# Patient Record
Sex: FEMALE | Race: WHITE | NOT HISPANIC OR LATINO | Employment: OTHER | URBAN - METROPOLITAN AREA
[De-identification: names, ages, dates, MRNs, and addresses within clinical notes are randomized per-mention and may not be internally consistent; named-entity substitution may affect disease eponyms.]

---

## 2017-01-25 ENCOUNTER — OFFICE VISIT (OUTPATIENT)
Dept: OPHTHALMOLOGY | Facility: CLINIC | Age: 82
End: 2017-01-25
Payer: MEDICARE

## 2017-01-25 ENCOUNTER — APPOINTMENT (OUTPATIENT)
Dept: OPHTHALMOLOGY | Facility: CLINIC | Age: 82
End: 2017-01-25
Payer: MEDICARE

## 2017-01-25 DIAGNOSIS — Z96.1 PSEUDOPHAKIA OF BOTH EYES: ICD-10-CM

## 2017-01-25 DIAGNOSIS — H26.491 POSTERIOR CAPSULAR OPACIFICATION, RIGHT: ICD-10-CM

## 2017-01-25 DIAGNOSIS — H04.123 DRY EYES, BILATERAL: ICD-10-CM

## 2017-01-25 DIAGNOSIS — H40.1131 PRIMARY OPEN ANGLE GLAUCOMA OF BOTH EYES, MILD STAGE: Primary | ICD-10-CM

## 2017-01-25 PROCEDURE — 92250 FUNDUS PHOTOGRAPHY W/I&R: CPT | Mod: PBBFAC,PO | Performed by: OPHTHALMOLOGY

## 2017-01-25 PROCEDURE — 92014 COMPRE OPH EXAM EST PT 1/>: CPT | Mod: S$PBB,,, | Performed by: OPHTHALMOLOGY

## 2017-01-25 PROCEDURE — 99212 OFFICE O/P EST SF 10 MIN: CPT | Mod: PBBFAC,PO | Performed by: OPHTHALMOLOGY

## 2017-01-25 PROCEDURE — 99999 PR PBB SHADOW E&M-EST. PATIENT-LVL II: CPT | Mod: PBBFAC,,, | Performed by: OPHTHALMOLOGY

## 2017-01-25 PROCEDURE — 92083 EXTENDED VISUAL FIELD XM: CPT | Mod: PBBFAC,PO | Performed by: OPHTHALMOLOGY

## 2017-01-25 NOTE — PROGRESS NOTES
SUBJECTIVE:   So Jaramillo is a 84 y.o. female   Uncorrected distance visual acuity was 20/40 -1 in the right eye and 20/25 in the left eye.   Chief Complaint   Patient presents with    Glaucoma     4 month HVF/FD, no drops    Dry Eye     Systane prn OU, Restasis prn OU        HPI:  HPI     Glaucoma    Additional comments: 4 month HVF/FD, no drops           Dry Eye    Additional comments: Systane prn OU, Restasis prn OU           Comments   Pt states she's been having a lot of dryness and pain in her eyes at night   OS>OD. States she's not using the Restasis as she should, also the   Systane.     1. PCIOL w/iStent OD 1/20/16   PCIOL OS w/iStent 12/30/15  2. Dry Eyes  3. Blepharitis/Rosacea  4. Mild COAG (Dx'd by Dr. Shaw) goal = 20  (init 21/21 on Latanoprost) (High IOP off of Latanoprost 30/30)         AT's PRN OU (usually 2 to 3 times a day)  Fish oil po  Restasis once in a while       Last edited by Lionel Donnelly on 1/25/2017  1:46 PM. (History)        Assessment /Plan :  1. Primary open angle glaucoma of both eyes, mild stage Doing well, IOP within acceptable range relative to target IOP and no evidence of progression. Continue current treatment. Reviewed importance of continued compliance with treatment and follow up.     2. Pseudophakia of both eyes doing well   3. Posterior capsular opacification, right discussed possible YAG in the future   4.      Dry Eyes continue Restasis OU BID and Artificial tears prn OU    Return to clinic in 4 months  or as needed.  With IOP Check and GOCT

## 2017-01-25 NOTE — MR AVS SNAPSHOT
[facility] - Ophthalmology  [address] [facility] [place] [address]  Phone: [phone]  Fax: [phone]                  So Jaramillo   2017 1:15 PM   Office Visit    Description:  Female : 10/6/1932   Provider:  Reynaldo Sauceda MD   Department:  [facility] - Ophthalmology           Reason for Visit     Glaucoma     Dry Eye           Diagnoses this Visit        Comments    Primary open angle glaucoma of both eyes, mild stage    -  Primary     Pseudophakia of both eyes         Posterior capsular opacification, right         Dry eyes, bilateral                To Do List           Goals (5 Years of Data)     None      Follow-Up and Disposition     Return in about 4 months (around 2017).      [facility]     [facility] On Call Nurse Care Line -  Assistance  Registered nurses in the [facility] On Call Center provide clinical advisement, health education, appointment booking, and other advisory services.  Call for this free service at [phone].             Medications           Message regarding Medications     Verify the changes and/or additions to your medication regime listed below are the same as discussed with your clinician today.  If any of these changes or additions are incorrect, please notify your healthcare provider.             Verify that the below list of medications is an accurate representation of the medications you are currently taking.  If none reported, the list may be blank. If incorrect, please contact your healthcare provider. Carry this list with you in case of emergency.           Current Medications     aspirin 81 MG Chew Take 81 mg by mouth once daily. Patient states that she takes this medication as needed.    CRANBERRY EXTRACT (CRANBERRY ORAL) Take by mouth as needed.    cycloSPORINE (RESTASIS) 0.05 % ophthalmic emulsion Place 0.4 mLs (1 drop total) into both eyes 2 (two) times daily.    dextran 70-hypromellose (TEARS) ophthalmic solution Place 1 drop into  both eyes as needed.    DOCOSAHEXANOIC ACID/EPA (FISH OIL ORAL) Take by mouth.    felodipine (PLENDIL) 5 MG 24 hr tablet Take 5 mg by mouth once daily.    hydrochlorothiazide (HYDRODIURIL) 12.5 MG Tab Take 12.5 mg by mouth once daily. Patient states that she does not take this medication every day.    lifitegrast 5 % Dpet Apply 1 drop to eye 2 (two) times daily.    lisinopril (PRINIVIL,ZESTRIL) 20 MG tablet Take 20 mg by mouth 2 (two) times daily.    lovastatin (MEVACOR) 40 MG tablet nightly.     multivitamin (ONE DAILY MULTIVITAMIN) per tablet Take 1 tablet by mouth as needed.    VITAMIN E ACETATE (VITAMIN E ORAL) Take by mouth as needed.           Clinical Reference Information           Allergies as of 1/25/2017     Sulfa (Sulfonamide Antibiotics)    Codeine      Immunizations Administered on Date of Encounter - 1/25/2017     None      Orders Placed During Today's Visit      Normal Orders This Visit    Color Fundus Photography - OU - Both Eyes     Walsh Visual Field - OU - Extended - Both Eyes       [facility] Sign-Up     Activating your [facility] account is as easy as 1-2-3!     1) Visit [email or web address], select Sign Up Now, enter this activation code and your date of birth, then select Next.  [email or web address]  Expires: 3/11/2017  2:03 PM      2) Create a username and password to use when you visit [facility] in the future and select a security question in case you lose your password and select Next.    3) Enter your e-mail address and click Sign Up!    Additional Information  If you have questions, please e-mail [email or web address] or call [phone] to talk to our [facility] staff. Remember, [facility] is NOT to be used for urgent needs. For medical emergencies, dial 911.

## 2017-06-26 ENCOUNTER — TELEPHONE (OUTPATIENT)
Dept: OPHTHALMOLOGY | Facility: CLINIC | Age: 82
End: 2017-06-26

## 2017-07-07 ENCOUNTER — OFFICE VISIT (OUTPATIENT)
Dept: OPHTHALMOLOGY | Facility: CLINIC | Age: 82
End: 2017-07-07
Payer: MEDICARE

## 2017-07-07 DIAGNOSIS — Z96.1 PSEUDOPHAKIA OF BOTH EYES: ICD-10-CM

## 2017-07-07 DIAGNOSIS — H04.123 DRY EYES, BILATERAL: ICD-10-CM

## 2017-07-07 DIAGNOSIS — H40.1131 PRIMARY OPEN ANGLE GLAUCOMA OF BOTH EYES, MILD STAGE: Primary | ICD-10-CM

## 2017-07-07 PROCEDURE — 99212 OFFICE O/P EST SF 10 MIN: CPT | Mod: PBBFAC,PO | Performed by: OPHTHALMOLOGY

## 2017-07-07 PROCEDURE — 92133 CPTRZD OPH DX IMG PST SGM ON: CPT | Mod: PBBFAC,PO | Performed by: OPHTHALMOLOGY

## 2017-07-07 PROCEDURE — 92012 INTRM OPH EXAM EST PATIENT: CPT | Mod: S$PBB,,, | Performed by: OPHTHALMOLOGY

## 2017-07-07 PROCEDURE — 99999 PR PBB SHADOW E&M-EST. PATIENT-LVL II: CPT | Mod: PBBFAC,,, | Performed by: OPHTHALMOLOGY

## 2017-07-07 NOTE — PROGRESS NOTES
SUBJECTIVE:   So Jaramillo is a 84 y.o. female   Uncorrected distance visual acuity was 20/40 -2 in the right eye and 20/25 -2 in the left eye.   Chief Complaint   Patient presents with    Glaucoma     here for 6 month iop check    Dry Eye     ou are very dry        HPI:  HPI     Glaucoma    Additional comments: here for 6 month iop check           Dry Eye    Additional comments: ou are very dry           Comments   1. PCIOL w/iStent OD 1/20/16   PCIOL OS w/iStent 12/30/15  2. Dry Eyes  3. Blepharitis/Rosacea  4. Mild COAG (Dx'd by Dr. Shaw) goal = 20  (init 21/21 on Latanoprost) (High IOP off of Latanoprost 30/30)         AT's PRN OU (usually 2 to 3 times a day)  Fish oil po  Restasis once in a while       Last edited by Maricarmen Rodriguez MA on 7/7/2017 11:12 AM. (History)        Assessment /Plan :  1. Primary open angle glaucoma of both eyes, mild stage Doing well, IOP within acceptable range relative to target IOP and no evidence of progression. Continue current treatment. Reviewed importance of continued compliance with treatment and follow up.     2. Dry eyes, bilateral recommend Xiidra OU BID   3. Pseudophakia of both eyes stable     Return to clinic in 6 months  or as needed.  With 24-2 HVF, Dilation and SDP's

## 2018-06-05 ENCOUNTER — OFFICE VISIT (OUTPATIENT)
Dept: OPHTHALMOLOGY | Facility: CLINIC | Age: 83
End: 2018-06-05
Payer: MEDICARE

## 2018-06-05 DIAGNOSIS — H04.123 DRY EYES, BILATERAL: ICD-10-CM

## 2018-06-05 DIAGNOSIS — H26.491 POSTERIOR CAPSULAR OPACIFICATION, RIGHT: ICD-10-CM

## 2018-06-05 DIAGNOSIS — H40.1131 PRIMARY OPEN ANGLE GLAUCOMA OF BOTH EYES, MILD STAGE: Primary | ICD-10-CM

## 2018-06-05 DIAGNOSIS — Z96.1 PSEUDOPHAKIA OF BOTH EYES: ICD-10-CM

## 2018-06-05 PROCEDURE — 99212 OFFICE O/P EST SF 10 MIN: CPT | Mod: PBBFAC | Performed by: OPHTHALMOLOGY

## 2018-06-05 PROCEDURE — 99999 PR PBB SHADOW E&M-EST. PATIENT-LVL II: CPT | Mod: PBBFAC,,, | Performed by: OPHTHALMOLOGY

## 2018-06-05 PROCEDURE — 92012 INTRM OPH EXAM EST PATIENT: CPT | Mod: S$PBB,,, | Performed by: OPHTHALMOLOGY

## 2018-06-05 RX ORDER — LATANOPROST 50 UG/ML
1 SOLUTION/ DROPS OPHTHALMIC NIGHTLY
Qty: 1 BOTTLE | Refills: 12 | Status: SHIPPED | OUTPATIENT
Start: 2018-06-05 | End: 2019-06-27 | Stop reason: SDUPTHER

## 2018-06-05 NOTE — PROGRESS NOTES
SUBJECTIVE:   So Jaramillo is a 85 y.o. female   Uncorrected distance visual acuity was 20/50 +1 in the right eye and 20/25 -2 in the left eye.   Chief Complaint   Patient presents with    Annual Exam    Glaucoma        HPI:  HPI     Pt here for annual dilated exam. No pain or discomfort. VA is a bit   blurry. Pt states that she was using an alternative to Restasis that Dr. Sauceda prescribed last time and didn't like it. She didn't like that she   could taste the drop. She says that she is no longer taking anything for   her eyes.      1. PCIOL w/iStent OD 1/20/16   PCIOL OS w/iStent 12/30/15  2. Dry Eyes (Restasis ineffective)  3. Blepharitis/Rosacea  4. Mild COAG (Dx'd by Dr. Shaw) goal = 20  (init 21/21 on Latanoprost) (High IOP off of Latanoprost 30/30)         AT's PRN OU (usually 2 to 3 times a day)    Last edited by Claudy Jones, Patient Care Assistant on 6/5/2018 10:29   AM. (History)        Assessment /Plan :  1. Primary open angle glaucoma of both eyes, mild stage   IOP not within acceptable range relative to target IOP with risk of irreversible visual loss. Better IOP control is recommended. Discussed options, risks, and benefits of additional medication, SLT laser, and/or incisional glaucoma surgery. Reviewed importance of continued compliance with treatment and follow up.     Patient chooses to resume latanoprost OU qhs     2. Dry eyes, bilateral    3. Pseudophakia of both eyes    4. Posterior capsular opacification, right      Return to clinic in 2 months  or as needed. Does not want to go to [facility] office  With IOP Check and Dilation

## 2018-07-30 ENCOUNTER — HOSPITAL ENCOUNTER (EMERGENCY)
Facility: HOSPITAL | Age: 83
Discharge: HOME | End: 2018-07-30
Attending: EMERGENCY MEDICINE
Payer: MEDICARE

## 2018-07-30 VITALS
OXYGEN SATURATION: 97 % | RESPIRATION RATE: 16 BRPM | BODY MASS INDEX: 27.09 KG/M2 | DIASTOLIC BLOOD PRESSURE: 69 MMHG | SYSTOLIC BLOOD PRESSURE: 157 MMHG | HEART RATE: 84 BPM | WEIGHT: 138 LBS | HEIGHT: 60 IN | TEMPERATURE: 97 F

## 2018-07-30 DIAGNOSIS — R53.1 GENERALIZED WEAKNESS: Primary | ICD-10-CM

## 2018-07-30 DIAGNOSIS — R07.9 CHEST PAIN: ICD-10-CM

## 2018-07-30 LAB
ALBUMIN SERPL BCP-MCNC: 3.3 G/DL
ALP SERPL-CCNC: 83 U/L
ALT SERPL W/O P-5'-P-CCNC: 8 U/L
ANION GAP SERPL CALC-SCNC: 10 MMOL/L
AST SERPL-CCNC: 13 U/L
BACTERIA #/AREA URNS HPF: NORMAL /HPF
BASOPHILS # BLD AUTO: 0.01 K/UL
BASOPHILS NFR BLD: 0.2 %
BILIRUB SERPL-MCNC: 0.4 MG/DL
BILIRUB UR QL STRIP: NEGATIVE
BNP SERPL-MCNC: 67 PG/ML
BUN SERPL-MCNC: 24 MG/DL
CALCIUM SERPL-MCNC: 8.9 MG/DL
CHLORIDE SERPL-SCNC: 109 MMOL/L
CLARITY UR: CLEAR
CO2 SERPL-SCNC: 21 MMOL/L
COLOR UR: YELLOW
CREAT SERPL-MCNC: 0.9 MG/DL
DIFFERENTIAL METHOD: ABNORMAL
EOSINOPHIL # BLD AUTO: 0.1 K/UL
EOSINOPHIL NFR BLD: 1 %
ERYTHROCYTE [DISTWIDTH] IN BLOOD BY AUTOMATED COUNT: 12.9 %
EST. GFR  (AFRICAN AMERICAN): >60 ML/MIN/1.73 M^2
EST. GFR  (NON AFRICAN AMERICAN): 58 ML/MIN/1.73 M^2
GLUCOSE SERPL-MCNC: 117 MG/DL
GLUCOSE UR QL STRIP: NEGATIVE
HCT VFR BLD AUTO: 38.2 %
HGB BLD-MCNC: 12.8 G/DL
HGB UR QL STRIP: ABNORMAL
KETONES UR QL STRIP: NEGATIVE
LACTATE SERPL-SCNC: 2.9 MMOL/L
LEUKOCYTE ESTERASE UR QL STRIP: ABNORMAL
LYMPHOCYTES # BLD AUTO: 3.1 K/UL
LYMPHOCYTES NFR BLD: 49.2 %
MCH RBC QN AUTO: 31.6 PG
MCHC RBC AUTO-ENTMCNC: 33.5 G/DL
MCV RBC AUTO: 94 FL
MICROSCOPIC COMMENT: NORMAL
MONOCYTES # BLD AUTO: 0.5 K/UL
MONOCYTES NFR BLD: 7.6 %
NEUTROPHILS # BLD AUTO: 2.6 K/UL
NEUTROPHILS NFR BLD: 42 %
NITRITE UR QL STRIP: NEGATIVE
PH UR STRIP: 7 [PH] (ref 5–8)
PLATELET # BLD AUTO: 189 K/UL
PMV BLD AUTO: 10 FL
POTASSIUM SERPL-SCNC: 4 MMOL/L
PROT SERPL-MCNC: 6.4 G/DL
PROT UR QL STRIP: NEGATIVE
RBC # BLD AUTO: 4.05 M/UL
RBC #/AREA URNS HPF: 2 /HPF (ref 0–4)
SODIUM SERPL-SCNC: 140 MMOL/L
SP GR UR STRIP: 1.01 (ref 1–1.03)
SQUAMOUS #/AREA URNS HPF: 0 /HPF
T4 FREE SERPL-MCNC: 0.97 NG/DL
TROPONIN I SERPL DL<=0.01 NG/ML-MCNC: <0.006 NG/ML
TSH SERPL DL<=0.005 MIU/L-ACNC: 4.06 UIU/ML
URN SPEC COLLECT METH UR: ABNORMAL
UROBILINOGEN UR STRIP-ACNC: NEGATIVE EU/DL
WBC # BLD AUTO: 6.22 K/UL
WBC #/AREA URNS HPF: 5 /HPF (ref 0–5)

## 2018-07-30 PROCEDURE — 80053 COMPREHEN METABOLIC PANEL: CPT

## 2018-07-30 PROCEDURE — 84443 ASSAY THYROID STIM HORMONE: CPT

## 2018-07-30 PROCEDURE — 83880 ASSAY OF NATRIURETIC PEPTIDE: CPT

## 2018-07-30 PROCEDURE — 85025 COMPLETE CBC W/AUTO DIFF WBC: CPT

## 2018-07-30 PROCEDURE — 84439 ASSAY OF FREE THYROXINE: CPT

## 2018-07-30 PROCEDURE — 25000003 PHARM REV CODE 250: Performed by: EMERGENCY MEDICINE

## 2018-07-30 PROCEDURE — 93010 ELECTROCARDIOGRAM REPORT: CPT | Mod: ,,, | Performed by: INTERNAL MEDICINE

## 2018-07-30 PROCEDURE — 99285 EMERGENCY DEPT VISIT HI MDM: CPT | Mod: 25

## 2018-07-30 PROCEDURE — 93005 ELECTROCARDIOGRAM TRACING: CPT

## 2018-07-30 PROCEDURE — 83605 ASSAY OF LACTIC ACID: CPT

## 2018-07-30 PROCEDURE — 84484 ASSAY OF TROPONIN QUANT: CPT

## 2018-07-30 PROCEDURE — 81000 URINALYSIS NONAUTO W/SCOPE: CPT

## 2018-07-30 PROCEDURE — 96360 HYDRATION IV INFUSION INIT: CPT

## 2018-07-30 RX ADMIN — SODIUM CHLORIDE 2000 ML: 0.9 INJECTION, SOLUTION INTRAVENOUS at 05:07

## 2018-07-30 NOTE — ED PROVIDER NOTES
SCRIBE #1 NOTE: I, Carmen Marte, am scribing for, and in the presence of, Conrad Pelayo MD. I have scribed the entire note.      History      Chief Complaint   Patient presents with    Abdominal Pain     epigastric pain that woke her up pta. EMS reports pt was diaphoretic and hypotensive on arrival. pt given 100 ml bolus in route, pt took asa pta.        Review of patient's allergies indicates:   Allergen Reactions    Sulfa (sulfonamide antibiotics) Other (See Comments)     Stomach pain    Codeine     Tramadol Other (See Comments)        HPI   HPI    7/30/2018, 3:34 AM   History obtained from the patient   History limited secondary to patient being poor historian      History of Present Illness: So Jaramillo is a 85 y.o. female patient who presents to the Emergency Department for CP. Patient reports sxs woke her from sleep, but is unsure what time that was. Symptoms are episodic and moderate in severity. Patient states she is unsure how long sxs lasted. Patient states she is CP free at this time. No mitigating or exacerbating factors reported. Patient reports being diaphoretic during episode and generalized weakness. Prior tx includes aspirin. Patient denies any SOB, diaphoresis, palpitations, extremity numbness, leg swelling, dizziness, cough, N/V, and all other sxs at this time. Patient reports PSHx includes 1 stent. No further complaints or concerns at this time.     Arrival mode: EMS     PCP: Kan Alston MD       Past Medical History:  Past Medical History:   Diagnosis Date    Arthritis     Cataract     Glaucoma     Hypertension        Past Surgical History:  Past Surgical History:   Procedure Laterality Date    carotid surgery      CATARACT EXTRACTION W/  INTRAOCULAR LENS IMPLANT Right 1-20-16    I stent    CATARACT EXTRACTION W/  INTRAOCULAR LENS IMPLANT Left 12-30-15    I stent    HEMORRHOID SURGERY      HYSTERECTOMY      TONSILLECTOMY           Family History:  Family History   Problem  Relation Age of Onset    Glaucoma Cousin     Cancer Father     Diabetes Maternal Aunt     Stroke Maternal Aunt     Diabetes Maternal Grandmother     Diabetes Other     Strabismus Neg Hx     Retinal detachment Neg Hx     Macular degeneration Neg Hx     Blindness Neg Hx     Amblyopia Neg Hx     Cataracts Neg Hx     Hypertension Neg Hx     Thyroid disease Neg Hx        Social History:  Social History     Social History Main Topics    Smoking status: Never Smoker    Smokeless tobacco: Never Used    Alcohol use No    Drug use: No    Sexual activity: Unknown       ROS   Review of Systems   Constitutional: Positive for diaphoresis. Negative for fever.   HENT: Negative for sore throat.    Respiratory: Negative for cough and shortness of breath.    Cardiovascular: Positive for chest pain. Negative for palpitations and leg swelling.   Gastrointestinal: Negative for nausea and vomiting.   Genitourinary: Negative for dysuria.   Musculoskeletal: Negative for back pain.   Skin: Negative for rash.   Neurological: Positive for weakness (generalized). Negative for dizziness and numbness.   Hematological: Does not bruise/bleed easily.   All other systems reviewed and are negative.      Physical Exam      Initial Vitals   BP Pulse Resp Temp SpO2   07/30/18 0323 07/30/18 0322 07/30/18 0323 07/30/18 0323 07/30/18 0323   (!) 131/98 69 19 97.5 °F (36.4 °C) 98 %      MAP       --                 Physical Exam  Nursing Notes and Vital Signs Reviewed.  Constitutional: Patient is in no acute distress. Well-developed and well-nourished.  Head: Atraumatic. Normocephalic.  Eyes: PERRL. EOM intact. Conjunctivae are not pale. No scleral icterus.  ENT: Mucous membranes are moist. Oropharynx is clear and symmetric.    Neck: Supple. Full ROM. No lymphadenopathy.  Cardiovascular: Regular rate. Regular rhythm. No murmurs, rubs, or gallops. Distal pulses are 2+ and symmetric.  Pulmonary/Chest: No respiratory distress. Clear to  auscultation bilaterally. No wheezing or rales.  Abdominal: Soft and non-distended.  There is no tenderness.  No rebound, guarding, or rigidity. Good bowel sounds.  Genitourinary: No CVA tenderness  Musculoskeletal: Moves all extremities. No obvious deformities. No edema. No calf tenderness.  Skin: Warm and dry.  Neurological:  Alert, awake, and appropriate.  Normal speech.  No acute focal neurological deficits are appreciated.  Psychiatric: Normal affect. Good eye contact. Appropriate in content.    ED Course    Procedures  ED Vital Signs:  Vitals:    07/30/18 0322 07/30/18 0323 07/30/18 0402 07/30/18 0522   BP:  (!) 131/98 (!) 146/63 (!) 143/65   Pulse: 69 63 60 64   Resp:  19 20 15   Temp:  97.5 °F (36.4 °C)  97 °F (36.1 °C)   TempSrc:  Oral  Rectal   SpO2:  98% 98% 98%   Weight:  62.6 kg (138 lb)     Height:  5' (1.524 m)      07/30/18 0704   BP: (!) 157/69   Pulse: 84   Resp: 16   Temp:    TempSrc:    SpO2: 97%   Weight:    Height:        Abnormal Lab Results:  Labs Reviewed   CBC W/ AUTO DIFFERENTIAL - Abnormal; Notable for the following components:       Result Value    MCH 31.6 (*)     Lymph% 49.2 (*)     All other components within normal limits   COMPREHENSIVE METABOLIC PANEL - Abnormal; Notable for the following components:    CO2 21 (*)     Glucose 117 (*)     BUN, Bld 24 (*)     Albumin 3.3 (*)     ALT 8 (*)     eGFR if non  58 (*)     All other components within normal limits   LACTIC ACID, PLASMA - Abnormal; Notable for the following components:    Lactate (Lactic Acid) 2.9 (*)     All other components within normal limits   URINALYSIS - Abnormal; Notable for the following components:    Occult Blood UA 1+ (*)     Leukocytes, UA 1+ (*)     All other components within normal limits   TSH - Abnormal; Notable for the following components:    TSH 4.061 (*)     All other components within normal limits   B-TYPE NATRIURETIC PEPTIDE   TROPONIN I   T4, FREE   URINALYSIS MICROSCOPIC        All  Lab Results:  Results for orders placed or performed during the hospital encounter of 07/30/18   CBC auto differential   Result Value Ref Range    WBC 6.22 3.90 - 12.70 K/uL    RBC 4.05 4.00 - 5.40 M/uL    Hemoglobin 12.8 12.0 - 16.0 g/dL    Hematocrit 38.2 37.0 - 48.5 %    MCV 94 82 - 98 fL    MCH 31.6 (H) 27.0 - 31.0 pg    MCHC 33.5 32.0 - 36.0 g/dL    RDW 12.9 11.5 - 14.5 %    Platelets 189 150 - 350 K/uL    MPV 10.0 9.2 - 12.9 fL    Gran # (ANC) 2.6 1.8 - 7.7 K/uL    Lymph # 3.1 1.0 - 4.8 K/uL    Mono # 0.5 0.3 - 1.0 K/uL    Eos # 0.1 0.0 - 0.5 K/uL    Baso # 0.01 0.00 - 0.20 K/uL    Gran% 42.0 38.0 - 73.0 %    Lymph% 49.2 (H) 18.0 - 48.0 %    Mono% 7.6 4.0 - 15.0 %    Eosinophil% 1.0 0.0 - 8.0 %    Basophil% 0.2 0.0 - 1.9 %    Differential Method Automated    Comprehensive metabolic panel   Result Value Ref Range    Sodium 140 136 - 145 mmol/L    Potassium 4.0 3.5 - 5.1 mmol/L    Chloride 109 95 - 110 mmol/L    CO2 21 (L) 23 - 29 mmol/L    Glucose 117 (H) 70 - 110 mg/dL    BUN, Bld 24 (H) 8 - 23 mg/dL    Creatinine 0.9 0.5 - 1.4 mg/dL    Calcium 8.9 8.7 - 10.5 mg/dL    Total Protein 6.4 6.0 - 8.4 g/dL    Albumin 3.3 (L) 3.5 - 5.2 g/dL    Total Bilirubin 0.4 0.1 - 1.0 mg/dL    Alkaline Phosphatase 83 55 - 135 U/L    AST 13 10 - 40 U/L    ALT 8 (L) 10 - 44 U/L    Anion Gap 10 8 - 16 mmol/L    eGFR if African American >60 >60 mL/min/1.73 m^2    eGFR if non African American 58 (A) >60 mL/min/1.73 m^2   Brain natriuretic peptide   Result Value Ref Range    BNP 67 0 - 99 pg/mL   Lactic acid, plasma   Result Value Ref Range    Lactate (Lactic Acid) 2.9 (H) 0.5 - 2.2 mmol/L   Troponin I   Result Value Ref Range    Troponin I <0.006 0.000 - 0.026 ng/mL   Urinalysis   Result Value Ref Range    Specimen UA Urine, Clean Catch     Color, UA Yellow Yellow, Straw, Maria D    Appearance, UA Clear Clear    pH, UA 7.0 5.0 - 8.0    Specific Gravity, UA 1.010 1.005 - 1.030    Protein, UA Negative Negative    Glucose, UA Negative  Negative    Ketones, UA Negative Negative    Bilirubin (UA) Negative Negative    Occult Blood UA 1+ (A) Negative    Nitrite, UA Negative Negative    Urobilinogen, UA Negative <2.0 EU/dL    Leukocytes, UA 1+ (A) Negative   TSH   Result Value Ref Range    TSH 4.061 (H) 0.400 - 4.000 uIU/mL   T4, free   Result Value Ref Range    Free T4 0.97 0.71 - 1.51 ng/dL   Urinalysis Microscopic   Result Value Ref Range    RBC, UA 2 0 - 4 /hpf    WBC, UA 5 0 - 5 /hpf    Bacteria, UA None None-Occ /hpf    Squam Epithel, UA 0 /hpf    Microscopic Comment SEE COMMENT        Imaging Results:  Imaging Results          X-Ray Chest PA And Lateral (Final result)  Result time 07/30/18 07:40:55    Final result by Jerrod Cervantes MD (07/30/18 07:40:55)                 Impression:      No acute process seen.      Electronically signed by: Jerrod Cervantes MD  Date:    07/30/2018  Time:    07:40             Narrative:    EXAMINATION:  XR CHEST PA AND LATERAL    CLINICAL HISTORY:  Chest pain, unspecified    COMPARISON:  None    FINDINGS:  Cardiac silhouette is normal.  The lungs demonstrate no evidence of active disease.  No evidence of pleural effusion or pneumothorax.  Bones demonstrate moderate degenerative changes..                               Ordered, reviewed, and independently interpreted by the ED provider.  Study: X-ray Chest  Findings: NAF    The EKG was ordered, reviewed, and independently interpreted by the ED provider.  Interpretation time: 0323  Rate: 61 BPM  Rhythm: atrial fibrillation  Interpretation: Nonspecific T wave abnormality. Abnormal ECG. No STEMI.         The Emergency Provider reviewed the vital signs and test results, which are outlined above.    ED Discussion     5:43 AM: Re-evaluated pt. Pt is resting comfortably and is in no acute distress.  D/w pt all pertinent results. D/w pt any concerns expressed at this time. Answered all questions. Pt expresses understanding at this time. Patient is requesting her daughter be  contacted, [phone].    6:02 AM: Called with patient's daughter, Suzi. Daughter states patient has been c/o CP for weeks. Patient's daughter states she is on her way to the ED to pick the patient up. Daughter states the patient will spend the next few days with her. Discussed with daughter to have patient f/u with PCP in a few days.    6:10 AM: Reassessed pt at this time. Discussed with pt all pertinent ED information and results. Discussed pt dx and plan of tx. Gave pt all f/u and return to the ED instructions. All questions and concerns were addressed at this time. Pt expresses understanding of information and instructions, and is comfortable with plan to discharge. Pt is stable for discharge.    I discussed with patient and/or family/caretaker that evaluation in the ED does not suggest any emergent or life threatening medical conditions requiring immediate intervention beyond what was provided in the ED, and I believe patient is safe for discharge.  Regardless, an unremarkable evaluation in the ED does not preclude the development or presence of a serious of life threatening condition. As such, patient was instructed to return immediately for any worsening or change in current symptoms.    ED Medication(s):  Medications   sodium chloride 0.9% bolus 2,000 mL (0 mLs Intravenous Stopped 7/30/18 0653)       Discharge Medication List as of 7/30/2018  6:11 AM          Follow-up Information     Kan Alston MD. Schedule an appointment as soon as possible for a visit in 3 days.    Specialty:  Internal Medicine  Contact information:  [address] [facility] [address]  [phone]             Go to  [facility]    Specialty:  Emergency Medicine  Why:  As needed, If symptoms worsen  Contact information:  [id number] [place] [address] [phone]                   Medical Decision Making    Medical Decision Making:   Clinical Tests:   Lab Tests: Ordered and  Reviewed  Radiological Study: Ordered and Reviewed  Medical Tests: Ordered and Reviewed           Scribe Attestation:   Scribe #1: I performed the above scribed service and the documentation accurately describes the services I performed. I attest to the accuracy of the note.    Attending:   Physician Attestation Statement for Scribe #1: I, Conrad Pelayo MD, personally performed the services described in this documentation, as scribed by Carmen Marte, in my presence, and it is both accurate and complete.          Clinical Impression       ICD-10-CM ICD-9-CM   1. Generalized weakness R53.1 780.79   2. Chest pain R07.9 786.50       Disposition:   Disposition: Discharged  Condition: Stable         Conrad Pelayo MD  09/01/18 6939

## 2018-07-30 NOTE — ED NOTES
Pt lying in bed, aaox4, in no acute distress, and with no complaint. Pt aware she is awaiting fluid admin and follow up by physician. Will continue to monitor. Bed in low position, side rails up, call light in reach.

## 2018-10-04 ENCOUNTER — OFFICE VISIT (OUTPATIENT)
Dept: OPHTHALMOLOGY | Facility: CLINIC | Age: 83
End: 2018-10-04
Payer: MEDICARE

## 2018-10-04 DIAGNOSIS — H52.4 BILATERAL PRESBYOPIA: ICD-10-CM

## 2018-10-04 DIAGNOSIS — Z96.1 PSEUDOPHAKIA OF BOTH EYES: ICD-10-CM

## 2018-10-04 DIAGNOSIS — H04.123 DRY EYES, BILATERAL: ICD-10-CM

## 2018-10-04 DIAGNOSIS — H40.1131 PRIMARY OPEN ANGLE GLAUCOMA OF BOTH EYES, MILD STAGE: Primary | ICD-10-CM

## 2018-10-04 PROCEDURE — 99999 PR PBB SHADOW E&M-EST. PATIENT-LVL II: CPT | Mod: PBBFAC,,, | Performed by: OPTOMETRIST

## 2018-10-04 PROCEDURE — 99212 OFFICE O/P EST SF 10 MIN: CPT | Mod: PBBFAC | Performed by: OPTOMETRIST

## 2018-10-04 PROCEDURE — 92015 DETERMINE REFRACTIVE STATE: CPT | Mod: ,,, | Performed by: OPTOMETRIST

## 2018-10-04 PROCEDURE — 92014 COMPRE OPH EXAM EST PT 1/>: CPT | Mod: S$PBB,,, | Performed by: OPTOMETRIST

## 2018-10-04 NOTE — PROGRESS NOTES
HPI     Glaucoma      Additional comments: IOP Check And Dilation              Comments     CPG Patient    Patient States Vision is Stable And No Discomfort      1. PCIOL w/iStent OD 1/20/16   PCIOL OS w/iStent 12/30/15  2. Dry Eyes (Restasis ineffective)  3. Blepharitis/Rosacea  4. Mild COAG (Dx'd by Dr. Shaw) goal = 20  (init 21/21 on Latanoprost) (High IOP off of Latanoprost 30/30)     OU: Latanoprost QHS   OU: AT's PRN          Last edited by Nisha Thompson on 10/4/2018 11:15 AM. (History)            Assessment /Plan     For exam results, see Encounter Report.    Primary open angle glaucoma of both eyes, mild stage    Dry eyes, bilateral    Pseudophakia of both eyes    Bilateral presbyopia      Stable IOP with latanoprost and iSent OU    Will resume O3FO    Artificial tears as needed.    Dispense Final Rx for glasses.  RTC 4 months IOP ck  Discussed above and answered questions.

## 2019-01-09 ENCOUNTER — OFFICE VISIT (OUTPATIENT)
Dept: OPHTHALMOLOGY | Facility: CLINIC | Age: 84
End: 2019-01-09
Payer: MEDICARE

## 2019-01-09 DIAGNOSIS — Z96.1 PSEUDOPHAKIA OF BOTH EYES: ICD-10-CM

## 2019-01-09 DIAGNOSIS — H40.1131 PRIMARY OPEN ANGLE GLAUCOMA OF BOTH EYES, MILD STAGE: ICD-10-CM

## 2019-01-09 DIAGNOSIS — H04.123 DRY EYES, BILATERAL: Primary | ICD-10-CM

## 2019-01-09 PROCEDURE — 92012 INTRM OPH EXAM EST PATIENT: CPT | Mod: S$PBB,,, | Performed by: OPHTHALMOLOGY

## 2019-01-09 PROCEDURE — 99999 PR PBB SHADOW E&M-EST. PATIENT-LVL II: ICD-10-PCS | Mod: PBBFAC,,, | Performed by: OPHTHALMOLOGY

## 2019-01-09 PROCEDURE — 99999 PR PBB SHADOW E&M-EST. PATIENT-LVL II: CPT | Mod: PBBFAC,,, | Performed by: OPHTHALMOLOGY

## 2019-01-09 PROCEDURE — 99212 OFFICE O/P EST SF 10 MIN: CPT | Mod: PBBFAC | Performed by: OPHTHALMOLOGY

## 2019-01-09 PROCEDURE — 92012 PR EYE EXAM, EST PATIENT,INTERMED: ICD-10-PCS | Mod: S$PBB,,, | Performed by: OPHTHALMOLOGY

## 2019-01-09 RX ORDER — CYCLOSPORINE 0.5 MG/ML
1 EMULSION OPHTHALMIC 2 TIMES DAILY
Qty: 60 VIAL | Refills: 12 | Status: SHIPPED | OUTPATIENT
Start: 2019-01-09 | End: 2021-04-12 | Stop reason: SDUPTHER

## 2019-01-09 NOTE — PROGRESS NOTES
SUBJECTIVE:   So Jaramillo is a 86 y.o. female   Uncorrected distance visual acuity was 20/50 in the right eye and 20/30 +1 in the left eye.   Chief Complaint   Patient presents with    Eye Problem        HPI:  HPI     Patient states that her right eye has been red and sore for 2 weeks. (8   on the pain scale)  Patient states that she is interested in restarting Restasis.  Patient is having a foreign body sensation right eye.    1. PCIOL w/iStent OD 1/20/16   PCIOL OS w/iStent 12/30/15  2. Dry Eyes (Restasis ineffective)  3. Blepharitis/Rosacea  4. Mild COAG (Dx'd by Dr. Shaw) goal = 20  (init 21/21 on Latanoprost) (High IOP off of Latanoprost 30/30)       OU: Latanoprost QHS   OU: Systane PRN    Last edited by Lisa Dior on 1/9/2019  1:29 PM. (History)        Assessment /Plan :  1. Dry eyes, bilateral resume restasis   2. Pseudophakia of both eyes    3. Primary open angle glaucoma of both eyes, mild stage        RTC as scheduled or prn

## 2019-02-12 ENCOUNTER — OFFICE VISIT (OUTPATIENT)
Dept: OPHTHALMOLOGY | Facility: CLINIC | Age: 84
End: 2019-02-12
Payer: MEDICARE

## 2019-02-12 DIAGNOSIS — Z96.1 PSEUDOPHAKIA OF BOTH EYES: ICD-10-CM

## 2019-02-12 DIAGNOSIS — H40.1131 PRIMARY OPEN ANGLE GLAUCOMA OF BOTH EYES, MILD STAGE: Primary | ICD-10-CM

## 2019-02-12 DIAGNOSIS — H04.123 DRY EYES, BILATERAL: ICD-10-CM

## 2019-02-12 DIAGNOSIS — G43.109 OCULAR MIGRAINE: ICD-10-CM

## 2019-02-12 PROCEDURE — 99212 OFFICE O/P EST SF 10 MIN: CPT | Mod: PBBFAC | Performed by: OPHTHALMOLOGY

## 2019-02-12 PROCEDURE — 92012 INTRM OPH EXAM EST PATIENT: CPT | Mod: S$PBB,,, | Performed by: OPHTHALMOLOGY

## 2019-02-12 PROCEDURE — 99999 PR PBB SHADOW E&M-EST. PATIENT-LVL II: CPT | Mod: PBBFAC,,, | Performed by: OPHTHALMOLOGY

## 2019-02-12 PROCEDURE — 99999 PR PBB SHADOW E&M-EST. PATIENT-LVL II: ICD-10-PCS | Mod: PBBFAC,,, | Performed by: OPHTHALMOLOGY

## 2019-02-12 PROCEDURE — 92012 PR EYE EXAM, EST PATIENT,INTERMED: ICD-10-PCS | Mod: S$PBB,,, | Performed by: OPHTHALMOLOGY

## 2019-02-12 NOTE — PROGRESS NOTES
SUBJECTIVE:   So Jaramillo is a 86 y.o. female   Uncorrected distance visual acuity was 20/40 +1 in the right eye and 20/25 -2 in the left eye.   Chief Complaint   Patient presents with    Glaucoma     Latanoprost OU qhs, Restasis OU BID, Systane prn OU        HPI:  HPI     Glaucoma      Additional comments: Latanoprost OU qhs, Restasis OU BID, Systane prn OU                Comments     4 months glaucoma check ( IOP check)     Patient states that she had  2 episodes of seeing crystal lights. Lasts   for a few minutes ? Both eyes  No headache    1. PCIOL w/iStent OD 1/20/16   PCIOL OS w/iStent 12/30/15  2. Dry Eyes (Restasis ineffective)  Xiidra - bad taste  3. Blepharitis/Rosacea  4. Mild COAG (Dx'd by Dr. Shaw) goal = 20  (init 21/21 on Latanoprost) (High IOP off of Latanoprost 30/30)       OU: Latanoprost QHS   OU: Systane PRN  Restasis OU BID              Last edited by Lisa Dior on 2/12/2019 10:10 AM. (History)        Assessment /Plan :  1. Primary open angle glaucoma of both eyes, mild stage Doing well, IOP within acceptable range relative to target IOP and no evidence of progression. Continue current treatment. Reviewed importance of continued compliance with treatment and follow up.     2. Pseudophakia of both eyes  -- Condition stable, no therapeutic change required. Monitoring routinely.     3. Dry eyes, bilateral  -- Condition stable, no therapeutic change required. Monitoring routinely.     4. Ocular migraine discussed reassurance and precautions       Return to clinic in 4 months  or as needed.  With IOP Check

## 2019-06-04 ENCOUNTER — OFFICE VISIT (OUTPATIENT)
Dept: OPHTHALMOLOGY | Facility: CLINIC | Age: 84
End: 2019-06-04
Payer: MEDICARE

## 2019-06-04 DIAGNOSIS — H04.123 DRY EYES, BILATERAL: ICD-10-CM

## 2019-06-04 DIAGNOSIS — H40.1131 PRIMARY OPEN ANGLE GLAUCOMA OF BOTH EYES, MILD STAGE: Primary | ICD-10-CM

## 2019-06-04 DIAGNOSIS — G43.109 OCULAR MIGRAINE: ICD-10-CM

## 2019-06-04 DIAGNOSIS — Z96.1 PSEUDOPHAKIA OF BOTH EYES: ICD-10-CM

## 2019-06-04 DIAGNOSIS — H26.491 POSTERIOR CAPSULAR OPACIFICATION, RIGHT: ICD-10-CM

## 2019-06-04 PROCEDURE — 92014 PR EYE EXAM, EST PATIENT,COMPREHESV: ICD-10-PCS | Mod: S$PBB,,, | Performed by: OPHTHALMOLOGY

## 2019-06-04 PROCEDURE — 99212 OFFICE O/P EST SF 10 MIN: CPT | Mod: PBBFAC | Performed by: OPHTHALMOLOGY

## 2019-06-04 PROCEDURE — 99999 PR PBB SHADOW E&M-EST. PATIENT-LVL II: CPT | Mod: PBBFAC,,, | Performed by: OPHTHALMOLOGY

## 2019-06-04 PROCEDURE — 99999 PR PBB SHADOW E&M-EST. PATIENT-LVL II: ICD-10-PCS | Mod: PBBFAC,,, | Performed by: OPHTHALMOLOGY

## 2019-06-04 PROCEDURE — 92014 COMPRE OPH EXAM EST PT 1/>: CPT | Mod: S$PBB,,, | Performed by: OPHTHALMOLOGY

## 2019-06-04 RX ORDER — AMOXICILLIN AND CLAVULANATE POTASSIUM 875; 125 MG/1; MG/1
1 TABLET, FILM COATED ORAL 2 TIMES DAILY
Refills: 0 | COMMUNITY
Start: 2019-03-25

## 2019-06-04 RX ORDER — AMLODIPINE BESYLATE 5 MG/1
5 TABLET ORAL DAILY
COMMUNITY
Start: 2019-06-03

## 2019-06-04 RX ORDER — HYDROCODONE BITARTRATE AND ACETAMINOPHEN 5; 325 MG/1; MG/1
0.5 TABLET ORAL DAILY PRN
COMMUNITY
Start: 2018-08-07

## 2019-06-04 RX ORDER — EZETIMIBE 10 MG/1
10 TABLET ORAL DAILY
Refills: 3 | COMMUNITY
Start: 2019-05-07

## 2019-06-04 RX ORDER — FLUTICASONE PROPIONATE 0.5 MG/G
CREAM TOPICAL
COMMUNITY

## 2019-06-04 NOTE — PROGRESS NOTES
SUBJECTIVE:   So Jaramillo is a 86 y.o. female   Uncorrected distance visual acuity was 20/300 in the right eye and 20/40 in the left eye.   Chief Complaint   Patient presents with    Glaucoma        HPI:  HPI     Pt here for 4m IOP chk. No pain or discomfort. VA stable. 100% compliant   with gtts.     CPG Patient      1. PCIOL w/iStent OD 1/20/16   PCIOL OS w/iStent 12/30/15  2. Dry Eyes (Restasis ineffective)  Xiidra - bad taste  3. Blepharitis/Rosacea  4. Mild COAG (Dx'd by Dr. Shaw) goal = 20  (init 21/21 on Latanoprost) (High IOP off of Latanoprost 30/30)   5. Ocular Migraine      OU: Latanoprost QHS   OU: Systane PRN  Restasis OU BID  Fish oil po prn    Last edited by Claudy Jones, Patient Care Assistant on 6/4/2019 10:11   AM. (History)        Assessment /Plan :  1. Primary open angle glaucoma of both eyes, mild stage Doing well, IOP within acceptable range relative to target IOP and no evidence of progression. Continue current treatment. Reviewed importance of continued compliance with treatment and follow up.     2. Pseudophakia of both eyes stable   3. Dry eyes, bilateral stable with current treatment   4. Ocular migraine    5. Posterior capsular opacification, right discussed RBA of YAG OD, pt agrees     RTC for YAG OD

## 2019-06-27 DIAGNOSIS — H40.1131 PRIMARY OPEN ANGLE GLAUCOMA OF BOTH EYES, MILD STAGE: ICD-10-CM

## 2019-06-28 RX ORDER — LATANOPROST 50 UG/ML
1 SOLUTION/ DROPS OPHTHALMIC NIGHTLY
Qty: 2.5 ML | Refills: 12 | Status: SHIPPED | OUTPATIENT
Start: 2019-06-28 | End: 2021-02-23

## 2019-07-08 ENCOUNTER — OFFICE VISIT (OUTPATIENT)
Dept: OPHTHALMOLOGY | Facility: CLINIC | Age: 84
End: 2019-07-08
Payer: MEDICARE

## 2019-07-08 DIAGNOSIS — H26.491 POSTERIOR CAPSULAR OPACIFICATION, RIGHT: Primary | ICD-10-CM

## 2019-07-08 PROCEDURE — 66821 YAG CAPSULOTOMY - OD - RIGHT EYE: ICD-10-PCS | Mod: S$PBB,RT,, | Performed by: OPHTHALMOLOGY

## 2019-07-08 PROCEDURE — 99499 UNLISTED E&M SERVICE: CPT | Mod: S$PBB,,, | Performed by: OPHTHALMOLOGY

## 2019-07-08 PROCEDURE — 99211 OFF/OP EST MAY X REQ PHY/QHP: CPT | Mod: PBBFAC | Performed by: OPHTHALMOLOGY

## 2019-07-08 PROCEDURE — 99499 NO LOS: ICD-10-PCS | Mod: S$PBB,,, | Performed by: OPHTHALMOLOGY

## 2019-07-08 PROCEDURE — 99999 PR PBB SHADOW E&M-EST. PATIENT-LVL I: ICD-10-PCS | Mod: PBBFAC,,, | Performed by: OPHTHALMOLOGY

## 2019-07-08 PROCEDURE — 99999 PR PBB SHADOW E&M-EST. PATIENT-LVL I: CPT | Mod: PBBFAC,,, | Performed by: OPHTHALMOLOGY

## 2019-07-08 PROCEDURE — 66821 AFTER CATARACT LASER SURGERY: CPT | Mod: PBBFAC,RT | Performed by: OPHTHALMOLOGY

## 2019-07-08 RX ORDER — FUROSEMIDE 20 MG/1
20 TABLET ORAL DAILY PRN
Refills: 0 | COMMUNITY
Start: 2019-06-12

## 2019-07-08 RX ORDER — PREDNISOLONE ACETATE 10 MG/ML
1 SUSPENSION/ DROPS OPHTHALMIC 4 TIMES DAILY
Qty: 1 BOTTLE | Refills: 2 | Status: SHIPPED | OUTPATIENT
Start: 2019-07-08 | End: 2019-07-15

## 2019-07-08 NOTE — PROGRESS NOTES
SUBJECTIVE:   So Jaramillo is a 86 y.o. female   Uncorrected distance visual acuity was 20/70 in the right eye and not recorded in the left eye.   Chief Complaint   Patient presents with    Blurred Vision     YAG OD        HPI:  HPI     Blurred Vision      Additional comments: YAG OD              Comments     Pt here for YAG OD.   No pain or discomfort.  VA stable.  100% compliant with gtts.     CPG Patient      1. PCIOL w/iStent OD 1/20/16   PCIOL OS w/iStent 12/30/15  2. Dry Eyes (Restasis ineffective)  Xiidra - bad taste  3. Blepharitis/Rosacea  4. Mild COAG (Dx'd by Dr. Shaw) goal = 20  (init 21/21 on Latanoprost) (High IOP off of Latanoprost 30/30)   5. Ocular Migraine      OU: Latanoprost QHS   OU: Systane PRN  Restasis OU BID  Fish oil po prn          Last edited by Claudy Jones, Patient Care Assistant on 7/8/2019  1:14   PM. (History)        Assessment /Plan :  1. Posterior capsular opacification, right  Yag Capsulotomy Procedure:    86 y.o. year old patient experiencing a symptomatic decrease in vision OD with inability to perform activities of daily living including reading.    SLE: Posterior intraocular lens implant with capsular fibrosis     Risks, benefits and alternatives of Yag Laser Capsulotomy discussed. Including risks of retinal detachment (1-3%), macular edema, dislocated implant, pain, inflammation elevated intraocular pressure and vision loss. Consent signed.    Medications given:  Apraclonidine gtt  Tetracaine gtt    Laser energy settings:  2.5  mJ / burst  58 bursts    IMPRESSION:  Yag Capsulotomy OD well tolerated    PLAN:  1. Prednisolone 1% QID over 1 week  2. Postoperative precautions discussed  3. RTC 2-3 weeks or prn

## 2019-08-13 ENCOUNTER — OFFICE VISIT (OUTPATIENT)
Dept: OPHTHALMOLOGY | Facility: CLINIC | Age: 84
End: 2019-08-13
Payer: MEDICARE

## 2019-08-13 DIAGNOSIS — Z98.890 POST-OPERATIVE STATE: ICD-10-CM

## 2019-08-13 DIAGNOSIS — H40.1131 PRIMARY OPEN ANGLE GLAUCOMA OF BOTH EYES, MILD STAGE: Primary | ICD-10-CM

## 2019-08-13 PROCEDURE — 99999 PR PBB SHADOW E&M-EST. PATIENT-LVL II: CPT | Mod: PBBFAC,,, | Performed by: OPHTHALMOLOGY

## 2019-08-13 PROCEDURE — 99024 PR POST-OP FOLLOW-UP VISIT: ICD-10-PCS | Mod: POP,,, | Performed by: OPHTHALMOLOGY

## 2019-08-13 PROCEDURE — 99999 PR PBB SHADOW E&M-EST. PATIENT-LVL II: ICD-10-PCS | Mod: PBBFAC,,, | Performed by: OPHTHALMOLOGY

## 2019-08-13 PROCEDURE — 99212 OFFICE O/P EST SF 10 MIN: CPT | Mod: PBBFAC | Performed by: OPHTHALMOLOGY

## 2019-08-13 PROCEDURE — 99024 POSTOP FOLLOW-UP VISIT: CPT | Mod: POP,,, | Performed by: OPHTHALMOLOGY

## 2019-08-13 NOTE — PROGRESS NOTES
SUBJECTIVE:   So Jaramillo is a 86 y.o. female   Uncorrected distance visual acuity was 20/80 -1 in the right eye and 20/30 -2 in the left eye.   Chief Complaint   Patient presents with    Post-op Evaluation    Glaucoma        HPI:  HPI     S.p yag od/ iop check      Doing ok states she need glasses failed her driving test     Last edited by Maricarmen Rodriguez MA on 8/13/2019 10:47 AM. (History)        Assessment /Plan :  1. Primary open angle glaucoma of both eyes, mild stage     Return to clinic in 3-4 months  or as needed.  With IOP Check     2. Post-operative state Exam:  SLE:  L/L- normal  S/C- white  K- stable  AC- no cells  LENS- PCIOL with clear capsulotomy    Assessment:    S/P Yag Capsulotomy OD . Discussed options for spectacle correction and pt elects to use Bifocal Rx. Recommend follow with Dr. Sauceda in 4 months, or sooner if needed

## 2019-09-10 ENCOUNTER — HOSPITAL ENCOUNTER (EMERGENCY)
Facility: HOSPITAL | Age: 84
Discharge: HOME | End: 2019-09-11
Attending: FAMILY MEDICINE
Payer: MEDICARE

## 2019-09-10 DIAGNOSIS — R53.1 WEAKNESS: ICD-10-CM

## 2019-09-10 DIAGNOSIS — R53.83 FATIGUE, UNSPECIFIED TYPE: Primary | ICD-10-CM

## 2019-09-10 LAB
ALBUMIN SERPL BCP-MCNC: 3.7 G/DL (ref 3.5–5.2)
ALP SERPL-CCNC: 92 U/L (ref 55–135)
ALT SERPL W/O P-5'-P-CCNC: 13 U/L (ref 10–44)
ANION GAP SERPL CALC-SCNC: 11 MMOL/L (ref 8–16)
AST SERPL-CCNC: 16 U/L (ref 10–40)
BASOPHILS # BLD AUTO: 0.01 K/UL (ref 0–0.2)
BASOPHILS NFR BLD: 0.1 % (ref 0–1.9)
BILIRUB SERPL-MCNC: 0.2 MG/DL (ref 0.1–1)
BILIRUB UR QL STRIP: NEGATIVE
BNP SERPL-MCNC: 76 PG/ML (ref 0–99)
BUN SERPL-MCNC: 28 MG/DL (ref 8–23)
CALCIUM SERPL-MCNC: 9.6 MG/DL (ref 8.7–10.5)
CHLORIDE SERPL-SCNC: 108 MMOL/L (ref 95–110)
CLARITY UR: CLEAR
CO2 SERPL-SCNC: 23 MMOL/L (ref 23–29)
COLOR UR: YELLOW
CREAT SERPL-MCNC: 0.9 MG/DL (ref 0.5–1.4)
DIFFERENTIAL METHOD: ABNORMAL
EOSINOPHIL # BLD AUTO: 0 K/UL (ref 0–0.5)
EOSINOPHIL NFR BLD: 0.1 % (ref 0–8)
ERYTHROCYTE [DISTWIDTH] IN BLOOD BY AUTOMATED COUNT: 13.2 % (ref 11.5–14.5)
EST. GFR  (AFRICAN AMERICAN): >60 ML/MIN/1.73 M^2
EST. GFR  (NON AFRICAN AMERICAN): 58 ML/MIN/1.73 M^2
GLUCOSE SERPL-MCNC: 156 MG/DL (ref 70–110)
GLUCOSE UR QL STRIP: ABNORMAL
HCT VFR BLD AUTO: 43.2 % (ref 37–48.5)
HGB BLD-MCNC: 14.3 G/DL (ref 12–16)
HGB UR QL STRIP: ABNORMAL
KETONES UR QL STRIP: ABNORMAL
LEUKOCYTE ESTERASE UR QL STRIP: NEGATIVE
LYMPHOCYTES # BLD AUTO: 1.4 K/UL (ref 1–4.8)
LYMPHOCYTES NFR BLD: 17.1 % (ref 18–48)
MCH RBC QN AUTO: 31.6 PG (ref 27–31)
MCHC RBC AUTO-ENTMCNC: 33.1 G/DL (ref 32–36)
MCV RBC AUTO: 95 FL (ref 82–98)
MONOCYTES # BLD AUTO: 0.2 K/UL (ref 0.3–1)
MONOCYTES NFR BLD: 2.7 % (ref 4–15)
NEUTROPHILS # BLD AUTO: 6.6 K/UL (ref 1.8–7.7)
NEUTROPHILS NFR BLD: 80.4 % (ref 38–73)
NITRITE UR QL STRIP: NEGATIVE
PH UR STRIP: 7 [PH] (ref 5–8)
PLATELET # BLD AUTO: 226 K/UL (ref 150–350)
PLATELET BLD QL SMEAR: ABNORMAL
PMV BLD AUTO: 10.5 FL (ref 9.2–12.9)
POTASSIUM SERPL-SCNC: 4.1 MMOL/L (ref 3.5–5.1)
PROT SERPL-MCNC: 7.4 G/DL (ref 6–8.4)
PROT UR QL STRIP: NEGATIVE
RBC # BLD AUTO: 4.53 M/UL (ref 4–5.4)
SODIUM SERPL-SCNC: 142 MMOL/L (ref 136–145)
SP GR UR STRIP: 1.01 (ref 1–1.03)
TROPONIN I SERPL DL<=0.01 NG/ML-MCNC: 0.01 NG/ML (ref 0–0.03)
URN SPEC COLLECT METH UR: ABNORMAL
UROBILINOGEN UR STRIP-ACNC: NEGATIVE EU/DL
WBC # BLD AUTO: 8.25 K/UL (ref 3.9–12.7)

## 2019-09-10 PROCEDURE — 80053 COMPREHEN METABOLIC PANEL: CPT

## 2019-09-10 PROCEDURE — 96365 THER/PROPH/DIAG IV INF INIT: CPT

## 2019-09-10 PROCEDURE — 96375 TX/PRO/DX INJ NEW DRUG ADDON: CPT

## 2019-09-10 PROCEDURE — 81003 URINALYSIS AUTO W/O SCOPE: CPT

## 2019-09-10 PROCEDURE — 85025 COMPLETE CBC W/AUTO DIFF WBC: CPT

## 2019-09-10 PROCEDURE — 93010 EKG 12-LEAD: ICD-10-PCS | Mod: ,,, | Performed by: INTERNAL MEDICINE

## 2019-09-10 PROCEDURE — 93005 ELECTROCARDIOGRAM TRACING: CPT

## 2019-09-10 PROCEDURE — 84484 ASSAY OF TROPONIN QUANT: CPT

## 2019-09-10 PROCEDURE — 96376 TX/PRO/DX INJ SAME DRUG ADON: CPT

## 2019-09-10 PROCEDURE — 25000003 PHARM REV CODE 250: Performed by: FAMILY MEDICINE

## 2019-09-10 PROCEDURE — 96361 HYDRATE IV INFUSION ADD-ON: CPT

## 2019-09-10 PROCEDURE — 99285 EMERGENCY DEPT VISIT HI MDM: CPT | Mod: 25

## 2019-09-10 PROCEDURE — 83880 ASSAY OF NATRIURETIC PEPTIDE: CPT

## 2019-09-10 PROCEDURE — 63600175 PHARM REV CODE 636 W HCPCS: Performed by: FAMILY MEDICINE

## 2019-09-10 PROCEDURE — 93010 ELECTROCARDIOGRAM REPORT: CPT | Mod: ,,, | Performed by: INTERNAL MEDICINE

## 2019-09-10 RX ORDER — CLONIDINE HYDROCHLORIDE 0.2 MG/1
0.2 TABLET ORAL
Status: COMPLETED | OUTPATIENT
Start: 2019-09-10 | End: 2019-09-10

## 2019-09-10 RX ORDER — ONDANSETRON 2 MG/ML
4 INJECTION INTRAMUSCULAR; INTRAVENOUS
Status: COMPLETED | OUTPATIENT
Start: 2019-09-10 | End: 2019-09-10

## 2019-09-10 RX ADMIN — ONDANSETRON 4 MG: 2 INJECTION INTRAMUSCULAR; INTRAVENOUS at 07:09

## 2019-09-10 RX ADMIN — ONDANSETRON 4 MG: 2 INJECTION INTRAMUSCULAR; INTRAVENOUS at 09:09

## 2019-09-10 RX ADMIN — SODIUM CHLORIDE 1000 ML: 0.9 INJECTION, SOLUTION INTRAVENOUS at 09:09

## 2019-09-10 RX ADMIN — PROMETHAZINE HYDROCHLORIDE 12.5 MG: 25 INJECTION INTRAMUSCULAR; INTRAVENOUS at 10:09

## 2019-09-10 RX ADMIN — SODIUM CHLORIDE 1000 ML: 0.9 INJECTION, SOLUTION INTRAVENOUS at 11:09

## 2019-09-10 RX ADMIN — CLONIDINE HYDROCHLORIDE 0.2 MG: 0.2 TABLET ORAL at 09:09

## 2019-09-10 NOTE — ED NOTES
Bed: [facility] 3  Expected date:   Expected time:   Means of arrival:   Comments:

## 2019-09-11 VITALS
BODY MASS INDEX: 27.73 KG/M2 | DIASTOLIC BLOOD PRESSURE: 61 MMHG | WEIGHT: 142 LBS | RESPIRATION RATE: 17 BRPM | SYSTOLIC BLOOD PRESSURE: 132 MMHG | OXYGEN SATURATION: 100 % | HEART RATE: 70 BPM | TEMPERATURE: 98 F

## 2019-09-11 RX ORDER — HYDRALAZINE HYDROCHLORIDE 20 MG/ML
10 INJECTION INTRAMUSCULAR; INTRAVENOUS
Status: DISCONTINUED | OUTPATIENT
Start: 2019-09-11 | End: 2019-09-11 | Stop reason: HOSPADM

## 2019-09-11 NOTE — ED NOTES
"Patient requesting to use the restroom but addiment about not using the bedpan, patient stated "that thing was too much work last time, I want to get up and go to the bathroom." Explained to patient that due to her complaint, the bedpan is the better option. Patient continued to refuse bedpan. Offered patient a bedside commode with assistance by myself, patient ok with trying bedside commode. Assisted patient in and out of bed, patient tolerated well. Patients linen changed, patient placed back in bed, patient repositioned, call light in reach.   "

## 2019-09-11 NOTE — ED PROVIDER NOTES
"SCRIBE #1 NOTE: I, Mimi Tan, am scribing for, and in the presence of, Jud Baker MD. I have scribed the entire note.       History     Chief Complaint   Patient presents with    Fatigue     c/o weakness x 4 hours. states "I was unable to give off the couch so I called EMS"      Review of patient's allergies indicates:   Allergen Reactions    Sulfa (sulfonamide antibiotics) Other (See Comments)     Stomach pain    Codeine     Tramadol Other (See Comments)         History of Present Illness     HPI    9/10/2019, 9:20 PM  History obtained from the patient      History of Present Illness: So Jaramillo is a 86 y.o. female patient with a PMHx of HLD and HTN who presents to the Emergency Department for evaluation of n/v/d which onset gradually 4 hours ago. Patient reports eating a sandwich from [employer] that "tasted bad". Symptoms are constant and moderate in severity. No mitigating or exacerbating factors reported. Associated sxs include fatigue and generalized weakness. Patient denies any abdominal pain, fever, chills, dysuria, CP, SOB, palpitations, and all other sxs at this time. No prior Tx. No further complaints or concerns at this time.         Arrival mode: EMS    PCP: Kan Alston MD        Past Medical History:  Past Medical History:   Diagnosis Date    Arthritis     Cataract     Glaucoma     Hyperlipidemia     Hypertension        Past Surgical History:  Past Surgical History:   Procedure Laterality Date    carotid surgery      CATARACT EXTRACTION W/  INTRAOCULAR LENS IMPLANT Right 1-20-16    I stent    CATARACT EXTRACTION W/  INTRAOCULAR LENS IMPLANT Left 12-30-15    I stent    HEMORRHOID SURGERY      HYSTERECTOMY      TONSILLECTOMY           Family History:  Family History   Problem Relation Age of Onset    Glaucoma Cousin     Cancer Father     Diabetes Maternal Aunt     Stroke Maternal Aunt     Diabetes Maternal Grandmother     Diabetes Other     Strabismus Neg Hx     " Retinal detachment Neg Hx     Macular degeneration Neg Hx     Blindness Neg Hx     Amblyopia Neg Hx     Cataracts Neg Hx     Hypertension Neg Hx     Thyroid disease Neg Hx        Social History:  Social History     Tobacco Use    Smoking status: Never Smoker    Smokeless tobacco: Never Used   Substance and Sexual Activity    Alcohol use: No    Drug use: No    Sexual activity: unknown        Review of Systems     Review of Systems   Constitutional: Positive for fatigue. Negative for activity change, appetite change, chills, diaphoresis and fever.        (+) generalized weakness   HENT: Negative for congestion, ear pain, nosebleeds, rhinorrhea, sinus pain, sore throat and trouble swallowing.    Eyes: Negative for pain and discharge.   Respiratory: Negative for cough, chest tightness, shortness of breath, wheezing and stridor.    Cardiovascular: Negative for chest pain, palpitations and leg swelling.   Gastrointestinal: Positive for diarrhea, nausea and vomiting. Negative for abdominal distention, abdominal pain, blood in stool and constipation.   Genitourinary: Negative for difficulty urinating, dysuria, flank pain, frequency, hematuria and urgency.   Musculoskeletal: Negative for arthralgias, back pain, myalgias and neck pain.   Skin: Negative for pallor, rash and wound.   Neurological: Negative for dizziness, syncope, weakness, light-headedness, numbness and headaches.   Hematological: Does not bruise/bleed easily.   Psychiatric/Behavioral: Negative for confusion and self-injury.   All other systems reviewed and are negative.     Physical Exam     Initial Vitals [09/10/19 1840]   BP Pulse Resp Temp SpO2   (!) 164/72 88 18 97.6 °F (36.4 °C) 99 %      MAP       --          Physical Exam  Nursing Notes and Vital Signs Reviewed.  Constitutional: Patient is in no acute distress. Well-developed and well-nourished.  Head: Atraumatic. Normocephalic.  Eyes: PERRL. EOM intact. Conjunctivae are not pale. No scleral  icterus.  ENT: Mucous membranes are moist. Oropharynx is clear and symmetric.    Neck: Supple. Full ROM. No lymphadenopathy.  Cardiovascular: Regular rate. Regular rhythm. No murmurs, rubs, or gallops. Distal pulses are 2+ and symmetric.  Pulmonary/Chest: No respiratory distress. Clear to auscultation bilaterally. No wheezing or rales.  Abdominal: Soft and non-distended.  There is no tenderness.  No rebound, guarding, or rigidity. Good bowel sounds.  Genitourinary: No CVA tenderness  Musculoskeletal: Moves all extremities. No obvious deformities. No edema. No calf tenderness.  Skin: Warm and dry.  Neurological:  Alert, awake, and appropriate.  Normal speech.  No acute focal neurological deficits are appreciated.  Psychiatric: Normal affect. Good eye contact. Appropriate in content.     ED Course   Procedures  ED Vital Signs:  Vitals:    09/10/19 1840 09/10/19 2047 09/10/19 2332 09/10/19 2347   BP: (!) 164/72 (!) 227/100 (!) 170/67 (!) 175/81   Pulse: 88 77 74 76   Resp: 18 (!) 7 19 (!) 33   Temp: 97.6 °F (36.4 °C)      TempSrc: Oral      SpO2: 99% (!) 94% 99% 100%   Weight: 64.4 kg (142 lb)       09/11/19 0002 09/11/19 0031 09/11/19 0103   BP: (!) 155/84 (!) 142/64 132/61   Pulse:  72 70   Resp:  19 17   Temp:      TempSrc:      SpO2:  99% 100%   Weight:          Abnormal Lab Results:  Labs Reviewed   CBC W/ AUTO DIFFERENTIAL - Abnormal; Notable for the following components:       Result Value    Mean Corpuscular Hemoglobin 31.6 (*)     Mono # 0.2 (*)     Gran% 80.4 (*)     Lymph% 17.1 (*)     Mono% 2.7 (*)     All other components within normal limits   COMPREHENSIVE METABOLIC PANEL - Abnormal; Notable for the following components:    Glucose 156 (*)     BUN, Bld 28 (*)     eGFR if non  58 (*)     All other components within normal limits   URINALYSIS - Abnormal; Notable for the following components:    Glucose, UA Trace (*)     Ketones, UA Trace (*)     Occult Blood UA Trace (*)     All other  components within normal limits   TROPONIN I   B-TYPE NATRIURETIC PEPTIDE        All Lab Results:  Results for orders placed or performed during the hospital encounter of 09/10/19   CBC auto differential   Result Value Ref Range    WBC 8.25 3.90 - 12.70 K/uL    RBC 4.53 4.00 - 5.40 M/uL    Hemoglobin 14.3 12.0 - 16.0 g/dL    Hematocrit 43.2 37.0 - 48.5 %    Mean Corpuscular Volume 95 82 - 98 fL    Mean Corpuscular Hemoglobin 31.6 (H) 27.0 - 31.0 pg    Mean Corpuscular Hemoglobin Conc 33.1 32.0 - 36.0 g/dL    RDW 13.2 11.5 - 14.5 %    Platelets 226 150 - 350 K/uL    MPV 10.5 9.2 - 12.9 fL    Gran # (ANC) 6.6 1.8 - 7.7 K/uL    Lymph # 1.4 1.0 - 4.8 K/uL    Mono # 0.2 (L) 0.3 - 1.0 K/uL    Eos # 0.0 0.0 - 0.5 K/uL    Baso # 0.01 0.00 - 0.20 K/uL    Gran% 80.4 (H) 38.0 - 73.0 %    Lymph% 17.1 (L) 18.0 - 48.0 %    Mono% 2.7 (L) 4.0 - 15.0 %    Eosinophil% 0.1 0.0 - 8.0 %    Basophil% 0.1 0.0 - 1.9 %    Platelet Estimate Appears normal     Differential Method Automated    Comprehensive metabolic panel   Result Value Ref Range    Sodium 142 136 - 145 mmol/L    Potassium 4.1 3.5 - 5.1 mmol/L    Chloride 108 95 - 110 mmol/L    CO2 23 23 - 29 mmol/L    Glucose 156 (H) 70 - 110 mg/dL    BUN, Bld 28 (H) 8 - 23 mg/dL    Creatinine 0.9 0.5 - 1.4 mg/dL    Calcium 9.6 8.7 - 10.5 mg/dL    Total Protein 7.4 6.0 - 8.4 g/dL    Albumin 3.7 3.5 - 5.2 g/dL    Total Bilirubin 0.2 0.1 - 1.0 mg/dL    Alkaline Phosphatase 92 55 - 135 U/L    AST 16 10 - 40 U/L    ALT 13 10 - 44 U/L    Anion Gap 11 8 - 16 mmol/L    eGFR if African American >60 >60 mL/min/1.73 m^2    eGFR if non African American 58 (A) >60 mL/min/1.73 m^2   Urinalysis - Clean Catch   Result Value Ref Range    Specimen UA Urine, Clean Catch     Color, UA Yellow Yellow, Straw, Maria D    Appearance, UA Clear Clear    pH, UA 7.0 5.0 - 8.0    Specific Gravity, UA 1.015 1.005 - 1.030    Protein, UA Negative Negative    Glucose, UA Trace (A) Negative    Ketones, UA Trace (A) Negative     Bilirubin (UA) Negative Negative    Occult Blood UA Trace (A) Negative    Nitrite, UA Negative Negative    Urobilinogen, UA Negative <2.0 EU/dL    Leukocytes, UA Negative Negative   Troponin I   Result Value Ref Range    Troponin I 0.008 0.000 - 0.026 ng/mL   B-Type natriuretic peptide (BNP)   Result Value Ref Range    BNP 76 0 - 99 pg/mL         Imaging Results:  Imaging Results          CT Abdomen Pelvis  Without Contrast (Final result)  Result time 09/10/19 23:24:21    Final result by Escobar Gould MD (09/10/19 23:24:21)                 Impression:      1. Negative for acute inflammatory process of the abdomen or pelvis.  2. 0.9 cm hyperdense soft tissue nodule of the gallbladder wall the fundus.  Finding could represent a large polyp.  Gallbladder mass also possible.  3. Diverticulosis.  All CT scans at this facility are performed  using dose modulation techniques as appropriate to performed exam including the following:  automated exposure control; adjustment of mA and/or kV according to the patients size (this includes techniques or standardized protocols for targeted exams where dose is matched to indication/reason for exam: i.e. extremities or head);  iterative reconstruction technique.      Electronically signed by: Escobar Gould  Date:    09/10/2019  Time:    23:24             Narrative:    EXAMINATION:  CT ABDOMEN PELVIS WITHOUT CONTRAST    CLINICAL HISTORY:  Abd pain, gastroenteritis or colitis suspected;.    TECHNIQUE:  Low dose axial images, sagittal and coronal reformations were obtained from the lung bases to the pubic symphysis.    COMPARISON:  None    FINDINGS:  Dependent atelectasis of the lung bases.    Normal liver.  0.9 cm hyperdense soft tissue density nodule identified at the gallbladder wall of the fundus.  Finding could represent a polyp.  Small gallbladder mass could also have this appearance (series 3, image 77).  The remaining gallbladder is normal.    The spleen is normal  in size and appearance.  The pancreas is normal.  The adrenal glands are normal.  Aorta normal in caliber with atherosclerotic calcifications.  IVC normal.  No retroperitoneal adenopathy.    The left kidney is normal.  Left ureter is normal.  Right kidney is normal.  Right ureter is normal.    Stomach is normal.  The small intestine is normal.  The appendix is normal.  Diverticulosis descending and sigmoid colon.  The rectum is normal.  The rectum is normal.    The pelvis demonstrates well distended normal-appearing bladder.  Uterus surgically absent.  The adnexal regions are normal.    Regional bones demonstrate degenerative changes of the spine.  No suspicious osseous lesions.  Abdominal and pelvic wall soft tissues are normal.                               X-Ray Chest 1 View (Final result)  Result time 09/10/19 21:55:22    Final result by Escobar Gould MD (09/10/19 21:55:22)                 Impression:      No acute findings.  No change since 07/30/2018.      Electronically signed by: Escobar Gould  Date:    09/10/2019  Time:    21:55             Narrative:    EXAMINATION:  XR CHEST 1 VIEW    CLINICAL HISTORY:  Weakness    COMPARISON:  07/30/2018    FINDINGS:  Lungs are clear.  Heart size within normal limits.No suspicious bony abnormality.  No significant change since the prior exam.                                 The EKG was ordered, reviewed, and independently interpreted by the ED provider.  Interpretation time: 20:35  Rate: 71 BPM  Rhythm: Sinus rhythm with premature supraventricular complexes  Interpretation: No acute ST changes. No STEMI.             The Emergency Provider reviewed the vital signs and test results, which are outlined above.     ED Discussion       10:27 PM: Re-evaluated pt. Pt is still c/o nausea after dose of Zofran.     11:59 PM: Re-evaluated pt. Pt is feeling better after phenergan. She does not want to go home and wants to be admitted.     12:03 AM: Dr. Baker discussed  the pt's case with Aliza Bliss NP (hospital medicine) who states pt does not meet admission criteria.    12:42 AM: Reassessed pt at this time.  Pt states her condition has improved at this time. Discussed with pt all pertinent ED information and results. Discussed pt dx and plan of tx. Gave pt all f/u and return to the ED instructions. All questions and concerns were addressed at this time. Pt expresses understanding of information and instructions, and is comfortable with plan to discharge. Pt is stable for discharge.    I discussed with patient and/or family/caretaker that evaluation in the ED does not suggest any emergent or life threatening medical conditions requiring immediate intervention beyond what was provided in the ED, and I believe patient is safe for discharge.  Regardless, an unremarkable evaluation in the ED does not preclude the development or presence of a serious of life threatening condition. As such, patient was instructed to return immediately for any worsening or change in current symptoms.       Medical Decision Making:   Clinical Tests:   Lab Tests: Ordered and Reviewed  Radiological Study: Ordered and Reviewed  Medical Tests: Ordered and Reviewed           ED Medication(s):  Medications   ondansetron injection 4 mg (4 mg Intravenous Given 9/10/19 1936)   ondansetron injection 4 mg (4 mg Intravenous Given 9/10/19 2135)   sodium chloride 0.9% bolus 1,000 mL (0 mLs Intravenous Stopped 9/10/19 2241)   cloNIDine tablet 0.2 mg (0.2 mg Oral Given 9/10/19 2159)   promethazine (PHENERGAN) 12.5 mg in dextrose 5 % 50 mL IVPB (0 mg Intravenous Stopped 9/10/19 2319)   sodium chloride 0.9% bolus 1,000 mL (0 mLs Intravenous Stopped 9/11/19 0022)       New Prescriptions    No medications       Follow-up Information     Schedule an appointment as soon as possible for a visit  with Kan Alston MD.    Specialty:  Internal Medicine  Why:  As needed  Contact information:  [address] [place] [address]  [id number]  [phone]                       Scribe Attestation:   Scribe #1: I performed the above scribed service and the documentation accurately describes the services I performed. I attest to the accuracy of the note.     Attending:   Physician Attestation Statement for Scribe #1: I, Jud Baker MD, personally performed the services described in this documentation, as scribed by Mimi Tan, in my presence, and it is both accurate and complete.           Clinical Impression       ICD-10-CM ICD-9-CM   1. Fatigue, unspecified type R53.83 780.79   2. Weakness R53.1 780.79       Disposition:   Disposition: Discharged  Condition: Stable         Jud Baker MD  09/12/19 2014

## 2019-12-06 ENCOUNTER — TELEPHONE (OUTPATIENT)
Dept: OPHTHALMOLOGY | Facility: CLINIC | Age: 84
End: 2019-12-06

## 2019-12-06 NOTE — TELEPHONE ENCOUNTER
----- Message from Sarah Rausch sent at 12/6/2019  8:16 AM CST -----  Contact: Patient  Please call patient concerning her eye drops and when does she need to stop using it . Please call at  [phone]

## 2019-12-17 ENCOUNTER — OFFICE VISIT (OUTPATIENT)
Dept: OPHTHALMOLOGY | Facility: CLINIC | Age: 84
End: 2019-12-17
Payer: MEDICARE

## 2019-12-17 DIAGNOSIS — H40.1131 PRIMARY OPEN ANGLE GLAUCOMA OF BOTH EYES, MILD STAGE: Primary | ICD-10-CM

## 2019-12-17 DIAGNOSIS — Z96.1 PSEUDOPHAKIA OF BOTH EYES: ICD-10-CM

## 2019-12-17 DIAGNOSIS — H04.123 DRY EYES, BILATERAL: ICD-10-CM

## 2019-12-17 PROCEDURE — 92012 INTRM OPH EXAM EST PATIENT: CPT | Mod: S$PBB,,, | Performed by: OPHTHALMOLOGY

## 2019-12-17 PROCEDURE — 99999 PR PBB SHADOW E&M-EST. PATIENT-LVL II: ICD-10-PCS | Mod: PBBFAC,,, | Performed by: OPHTHALMOLOGY

## 2019-12-17 PROCEDURE — 99999 PR PBB SHADOW E&M-EST. PATIENT-LVL II: CPT | Mod: PBBFAC,,, | Performed by: OPHTHALMOLOGY

## 2019-12-17 PROCEDURE — 92012 PR EYE EXAM, EST PATIENT,INTERMED: ICD-10-PCS | Mod: S$PBB,,, | Performed by: OPHTHALMOLOGY

## 2019-12-17 PROCEDURE — 99212 OFFICE O/P EST SF 10 MIN: CPT | Mod: PBBFAC | Performed by: OPHTHALMOLOGY

## 2019-12-17 NOTE — PROGRESS NOTES
SUBJECTIVE:   So Jaramillo is a 87 y.o. female   Corrected distance visual acuity was 20/40 +1 in the right eye and 20/30 +2 in the left eye.   Chief Complaint   Patient presents with    Glaucoma        HPI:  HPI     Pt here for 4m IOP chk. No pain or discomfort. VA stable. 100% compliant   with gtts. Pt states she needs a new Rx for Restasis.     CPG Patient      1. PCIOL w/iStent OD 1/20/16   PCIOL OS w/iStent 12/30/15  2. Dry Eyes (Restasis ineffective)  Xiidra - bad taste  3. Blepharitis/Rosacea  4. Mild COAG (Dx'd by Dr. Shaw) goal = 20  (init 21/21 on Latanoprost) (High IOP off of Latanoprost 30/30)   5. Ocular Migraine      OU: Latanoprost QHS   OU: Systane PRN  Restasis OU BID  Fish oil po prn    Last edited by Claudy Jones, Patient Care Assistant on 12/17/2019 10:23   AM. (History)        Assessment /Plan :  1. Primary open angle glaucoma of both eyes, mild stage Doing well, IOP within acceptable range relative to target IOP and no evidence of progression. Continue current treatment. Reviewed importance of continued compliance with treatment and follow up.     2. Pseudophakia of both eyes  -- Condition stable, no therapeutic change required. Monitoring routinely.     3. Dry eyes, bilateral  -- Condition stable, no therapeutic change required. Monitoring routinely.     Return to clinic in 4 months  or as needed.  With IOP Check

## 2020-06-23 ENCOUNTER — OFFICE VISIT (OUTPATIENT)
Dept: OPHTHALMOLOGY | Facility: CLINIC | Age: 85
End: 2020-06-23
Payer: MEDICARE

## 2020-06-23 DIAGNOSIS — H04.123 DRY EYES, BILATERAL: ICD-10-CM

## 2020-06-23 DIAGNOSIS — H40.1131 PRIMARY OPEN ANGLE GLAUCOMA OF BOTH EYES, MILD STAGE: Primary | ICD-10-CM

## 2020-06-23 DIAGNOSIS — Z96.1 PSEUDOPHAKIA OF BOTH EYES: ICD-10-CM

## 2020-06-23 PROCEDURE — 92012 PR EYE EXAM, EST PATIENT,INTERMED: ICD-10-PCS | Mod: S$PBB,,, | Performed by: OPHTHALMOLOGY

## 2020-06-23 PROCEDURE — 92012 INTRM OPH EXAM EST PATIENT: CPT | Mod: S$PBB,,, | Performed by: OPHTHALMOLOGY

## 2020-06-23 PROCEDURE — 99213 OFFICE O/P EST LOW 20 MIN: CPT | Mod: PBBFAC | Performed by: OPHTHALMOLOGY

## 2020-06-23 PROCEDURE — 99999 PR PBB SHADOW E&M-EST. PATIENT-LVL III: CPT | Mod: PBBFAC,,, | Performed by: OPHTHALMOLOGY

## 2020-06-23 PROCEDURE — 99999 PR PBB SHADOW E&M-EST. PATIENT-LVL III: ICD-10-PCS | Mod: PBBFAC,,, | Performed by: OPHTHALMOLOGY

## 2020-06-23 NOTE — PROGRESS NOTES
SUBJECTIVE  So ADRIANA Jaramillo is 87 y.o. female  Corrected distance visual acuity was 20/25 -1 in the right eye and 20/20 -2 in the left eye.   Chief Complaint   Patient presents with    Glaucoma          HPI     Pt here for 4m IOP chk. No pain, but pt states that she feels like there   is a film over her eyes. She says that she was supposed to try out   Restasis, but wasn't given the prescription for it. 100% compliant with   the Latanoprost.      CPG Patient      1. PCIOL w/iStent OD 1/20/16   PCIOL OS w/iStent 12/30/15  2. Dry Eyes (Restasis ineffective)  Xiidra - bad taste  3. Blepharitis/Rosacea  4. Mild COAG (Dx'd by Dr. Shaw) goal = 20  (init 21/21 on Latanoprost) (High IOP off of Latanoprost 30/30)   5. Ocular Migraine      OU: Latanoprost QHS     OU: Systane PRN  Restasis OU BID  Fish oil po prn    Last edited by Claudy Jones, Patient Care Assistant on 6/23/2020 10:05   AM. (History)         Assessment /Plan :  1. Primary open angle glaucoma of both eyes, mild stage Doing well, IOP within acceptable range relative to target IOP and no evidence of progression. Continue current treatment. Reviewed importance of continued compliance with treatment and follow up.     2. Pseudophakia of both eyes  -- Condition stable, no therapeutic change required. Monitoring routinely.     3. Dry eyes, bilateral stable     Return to clinic in 4 months  or as needed.  With GOCT, Dilation, HVF 24-2 and SDP

## 2020-07-02 NOTE — ED NOTES
Cleaned patient and changed patient into gown and put clean sheets on the bed -- patient provided urine sample on bedpan   strong bilaterally/equal bilaterally equal bilaterally/strong bilaterally

## 2020-08-17 ENCOUNTER — TELEPHONE (OUTPATIENT)
Dept: OPHTHALMOLOGY | Facility: CLINIC | Age: 85
End: 2020-08-17

## 2020-08-17 NOTE — TELEPHONE ENCOUNTER
----- Message from Charla Hedrick sent at 8/17/2020  9:55 AM CDT -----  Regarding: question  Contact: self [phone]  Would like to consult with the nurse, patient has some question  concerning some Eye Drops that she is using, patient would like  call back as soon as possible, please call back thanks sj

## 2020-08-17 NOTE — TELEPHONE ENCOUNTER
Spoke with pt.  She has a note saying for her to stop latanoprost, that was written on a card from Dr. Sauceda.  She was taking it at last visit, and Dr. Sauceda wants her to continue this treatment.  She has an appt coming up in October.

## 2021-02-23 DIAGNOSIS — H40.1131 PRIMARY OPEN ANGLE GLAUCOMA OF BOTH EYES, MILD STAGE: ICD-10-CM

## 2021-02-23 RX ORDER — LATANOPROST 50 UG/ML
1 SOLUTION/ DROPS OPHTHALMIC NIGHTLY
Qty: 2.5 ML | Refills: 1 | Status: SHIPPED | OUTPATIENT
Start: 2021-02-23 | End: 2021-03-17

## 2021-02-23 RX ORDER — LATANOPROST 50 UG/ML
1 SOLUTION/ DROPS OPHTHALMIC NIGHTLY
Qty: 2.5 ML | Refills: 1 | Status: CANCELLED | OUTPATIENT
Start: 2021-02-23

## 2021-04-12 DIAGNOSIS — H04.123 DRY EYES, BILATERAL: ICD-10-CM

## 2021-04-12 RX ORDER — CYCLOSPORINE 0.5 MG/ML
1 EMULSION OPHTHALMIC 2 TIMES DAILY
Qty: 60 VIAL | Refills: 12 | Status: SHIPPED | OUTPATIENT
Start: 2021-04-12 | End: 2022-11-29 | Stop reason: SDUPTHER

## 2021-05-04 DIAGNOSIS — M25.561 PAIN IN BOTH KNEES, UNSPECIFIED CHRONICITY: Primary | ICD-10-CM

## 2021-05-04 DIAGNOSIS — M25.562 PAIN IN BOTH KNEES, UNSPECIFIED CHRONICITY: Primary | ICD-10-CM

## 2021-05-11 ENCOUNTER — OFFICE VISIT (OUTPATIENT)
Dept: OPHTHALMOLOGY | Facility: CLINIC | Age: 86
End: 2021-05-11
Payer: MEDICARE

## 2021-05-11 DIAGNOSIS — H04.123 DRY EYES, BILATERAL: ICD-10-CM

## 2021-05-11 DIAGNOSIS — Z96.1 PSEUDOPHAKIA OF BOTH EYES: ICD-10-CM

## 2021-05-11 DIAGNOSIS — H40.1131 PRIMARY OPEN ANGLE GLAUCOMA OF BOTH EYES, MILD STAGE: Primary | ICD-10-CM

## 2021-05-11 PROCEDURE — 99999 PR PBB SHADOW E&M-EST. PATIENT-LVL III: ICD-10-PCS | Mod: PBBFAC,,, | Performed by: OPHTHALMOLOGY

## 2021-05-11 PROCEDURE — 92133 CPTRZD OPH DX IMG PST SGM ON: CPT | Mod: PBBFAC | Performed by: OPHTHALMOLOGY

## 2021-05-11 PROCEDURE — 99213 OFFICE O/P EST LOW 20 MIN: CPT | Mod: PBBFAC | Performed by: OPHTHALMOLOGY

## 2021-05-11 PROCEDURE — 99214 PR OFFICE/OUTPT VISIT, EST, LEVL IV, 30-39 MIN: ICD-10-PCS | Mod: S$PBB,,, | Performed by: OPHTHALMOLOGY

## 2021-05-11 PROCEDURE — 92133 POSTERIOR SEGMENT OCT OPTIC NERVE(OCULAR COHERENCE TOMOGRAPHY) - OU - BOTH EYES: ICD-10-PCS | Mod: 26,S$PBB,, | Performed by: OPHTHALMOLOGY

## 2021-05-11 PROCEDURE — 99999 PR PBB SHADOW E&M-EST. PATIENT-LVL III: CPT | Mod: PBBFAC,,, | Performed by: OPHTHALMOLOGY

## 2021-05-11 PROCEDURE — 92083 EXTENDED VISUAL FIELD XM: CPT | Mod: PBBFAC | Performed by: OPHTHALMOLOGY

## 2021-05-11 PROCEDURE — 92083 HUMPHREY VISUAL FIELD - OU - BOTH EYES: ICD-10-PCS | Mod: 26,S$PBB,, | Performed by: OPHTHALMOLOGY

## 2021-05-11 PROCEDURE — 99214 OFFICE O/P EST MOD 30 MIN: CPT | Mod: S$PBB,,, | Performed by: OPHTHALMOLOGY

## 2021-05-17 ENCOUNTER — OFFICE VISIT (OUTPATIENT)
Dept: ORTHOPEDICS | Facility: CLINIC | Age: 86
End: 2021-05-17
Payer: MEDICARE

## 2021-05-17 ENCOUNTER — HOSPITAL ENCOUNTER (OUTPATIENT)
Dept: RADIOLOGY | Facility: HOSPITAL | Age: 86
Discharge: HOME | End: 2021-05-17
Attending: PHYSICIAN ASSISTANT
Payer: MEDICARE

## 2021-05-17 VITALS
SYSTOLIC BLOOD PRESSURE: 129 MMHG | DIASTOLIC BLOOD PRESSURE: 67 MMHG | HEIGHT: 60 IN | BODY MASS INDEX: 26.5 KG/M2 | HEART RATE: 70 BPM | WEIGHT: 135 LBS

## 2021-05-17 DIAGNOSIS — M25.561 PAIN IN BOTH KNEES, UNSPECIFIED CHRONICITY: ICD-10-CM

## 2021-05-17 DIAGNOSIS — M17.0 ARTHRITIS OF BOTH KNEES: Primary | ICD-10-CM

## 2021-05-17 DIAGNOSIS — M25.561 CHRONIC PAIN OF RIGHT KNEE: ICD-10-CM

## 2021-05-17 DIAGNOSIS — M25.562 PAIN IN BOTH KNEES, UNSPECIFIED CHRONICITY: ICD-10-CM

## 2021-05-17 DIAGNOSIS — G89.29 CHRONIC PAIN OF RIGHT KNEE: ICD-10-CM
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
## 2021-05-17 PROCEDURE — 73564 X-RAY EXAM KNEE 4 OR MORE: CPT | Mod: 26,50,, | Performed by: RADIOLOGY

## 2021-05-17 PROCEDURE — 99999 PR PBB SHADOW E&M-EST. PATIENT-LVL IV: ICD-10-PCS | Mod: PBBFAC,,, | Performed by: PHYSICIAN ASSISTANT

## 2021-05-17 PROCEDURE — 73564 XR KNEE ORTHO BILAT WITH FLEXION: ICD-10-PCS | Mod: 26,50,, | Performed by: RADIOLOGY

## 2021-05-17 PROCEDURE — 99214 OFFICE O/P EST MOD 30 MIN: CPT | Mod: PBBFAC,25 | Performed by: PHYSICIAN ASSISTANT

## 2021-05-17 PROCEDURE — 99999 PR PBB SHADOW E&M-EST. PATIENT-LVL IV: CPT | Mod: PBBFAC,,, | Performed by: PHYSICIAN ASSISTANT

## 2021-05-17 PROCEDURE — 99203 PR OFFICE/OUTPT VISIT, NEW, LEVL III, 30-44 MIN: ICD-10-PCS | Mod: S$PBB,,, | Performed by: PHYSICIAN ASSISTANT

## 2021-05-17 PROCEDURE — 99203 OFFICE O/P NEW LOW 30 MIN: CPT | Mod: S$PBB,,, | Performed by: PHYSICIAN ASSISTANT

## 2021-05-17 PROCEDURE — 73564 X-RAY EXAM KNEE 4 OR MORE: CPT | Mod: TC,50

## 2021-05-17 RX ORDER — DICLOFENAC SODIUM 10 MG/G
2 GEL TOPICAL 3 TIMES DAILY PRN
Qty: 2 TUBE | Refills: 6 | Status: SHIPPED | OUTPATIENT
Start: 2021-05-17

## 2022-03-08 ENCOUNTER — OFFICE VISIT (OUTPATIENT)
Dept: OPHTHALMOLOGY | Facility: CLINIC | Age: 87
End: 2022-03-08
Payer: MEDICARE

## 2022-03-08 DIAGNOSIS — H04.123 DRY EYES, BILATERAL: ICD-10-CM

## 2022-03-08 DIAGNOSIS — Z96.1 PSEUDOPHAKIA OF BOTH EYES: ICD-10-CM

## 2022-03-08 DIAGNOSIS — H40.1131 PRIMARY OPEN ANGLE GLAUCOMA OF BOTH EYES, MILD STAGE: Primary | ICD-10-CM

## 2022-03-08 PROCEDURE — 92133 OCT, OPTIC NERVE - OU - BOTH EYES: ICD-10-PCS | Mod: 26,S$PBB,, | Performed by: OPHTHALMOLOGY

## 2022-03-08 PROCEDURE — 99999 PR PBB SHADOW E&M-EST. PATIENT-LVL III: CPT | Mod: PBBFAC,,, | Performed by: OPHTHALMOLOGY

## 2022-03-08 PROCEDURE — 99213 OFFICE O/P EST LOW 20 MIN: CPT | Mod: PBBFAC | Performed by: OPHTHALMOLOGY

## 2022-03-08 PROCEDURE — 99999 PR PBB SHADOW E&M-EST. PATIENT-LVL III: ICD-10-PCS | Mod: PBBFAC,,, | Performed by: OPHTHALMOLOGY

## 2022-03-08 PROCEDURE — 99214 PR OFFICE/OUTPT VISIT, EST, LEVL IV, 30-39 MIN: ICD-10-PCS | Mod: S$PBB,,, | Performed by: OPHTHALMOLOGY

## 2022-03-08 PROCEDURE — 99214 OFFICE O/P EST MOD 30 MIN: CPT | Mod: S$PBB,,, | Performed by: OPHTHALMOLOGY

## 2022-03-08 PROCEDURE — 92133 CPTRZD OPH DX IMG PST SGM ON: CPT | Mod: PBBFAC | Performed by: OPHTHALMOLOGY

## 2022-03-08 RX ORDER — ALPRAZOLAM 0.25 MG/1
TABLET ORAL
COMMUNITY

## 2022-03-08 RX ORDER — ROSUVASTATIN CALCIUM 20 MG/1
20 TABLET, COATED ORAL
COMMUNITY
Start: 2021-10-20 | End: 2022-04-18

## 2022-03-08 RX ORDER — PRAMIPEXOLE DIHYDROCHLORIDE 0.25 MG/1
TABLET ORAL
COMMUNITY
Start: 2021-12-24

## 2022-11-28 ENCOUNTER — TELEPHONE (OUTPATIENT)
Dept: OPHTHALMOLOGY | Facility: CLINIC | Age: 87
End: 2022-11-28
Payer: MEDICARE

## 2022-11-28 NOTE — TELEPHONE ENCOUNTER
Called to let pt know that we do not have any earlier appointments, unfortunately. Pt will come to her appointment at her appointment time.     ----- Message from Leesa Julien sent at 11/28/2022 11:38 AM CST -----  Pt have an appt tomorrow but would like to know if there is an earlier time slot available. Call bck number is [phone]. Thx. EL

## 2022-11-29 ENCOUNTER — OFFICE VISIT (OUTPATIENT)
Dept: OPHTHALMOLOGY | Facility: CLINIC | Age: 87
End: 2022-11-29
Payer: MEDICARE

## 2022-11-29 DIAGNOSIS — H26.492 LEFT POSTERIOR CAPSULAR OPACIFICATION: ICD-10-CM

## 2022-11-29 DIAGNOSIS — Z96.1 PSEUDOPHAKIA OF BOTH EYES: ICD-10-CM

## 2022-11-29 DIAGNOSIS — H40.1131 PRIMARY OPEN ANGLE GLAUCOMA OF BOTH EYES, MILD STAGE: Primary | ICD-10-CM

## 2022-11-29 DIAGNOSIS — H04.123 DRY EYES, BILATERAL: ICD-10-CM

## 2022-11-29 PROCEDURE — 92014 COMPRE OPH EXAM EST PT 1/>: CPT | Mod: S$PBB,,, | Performed by: OPHTHALMOLOGY

## 2022-11-29 PROCEDURE — 92083 EXTENDED VISUAL FIELD XM: CPT | Mod: PBBFAC | Performed by: OPHTHALMOLOGY

## 2022-11-29 PROCEDURE — 92014 PR EYE EXAM, EST PATIENT,COMPREHESV: ICD-10-PCS | Mod: S$PBB,,, | Performed by: OPHTHALMOLOGY

## 2022-11-29 PROCEDURE — 92083 HUMPHREY VISUAL FIELD - OU - BOTH EYES: ICD-10-PCS | Mod: 26,S$PBB,, | Performed by: OPHTHALMOLOGY

## 2022-11-29 PROCEDURE — 99999 PR PBB SHADOW E&M-EST. PATIENT-LVL III: ICD-10-PCS | Mod: PBBFAC,,, | Performed by: OPHTHALMOLOGY

## 2022-11-29 PROCEDURE — 99213 OFFICE O/P EST LOW 20 MIN: CPT | Mod: PBBFAC | Performed by: OPHTHALMOLOGY

## 2022-11-29 PROCEDURE — 99999 PR PBB SHADOW E&M-EST. PATIENT-LVL III: CPT | Mod: PBBFAC,,, | Performed by: OPHTHALMOLOGY

## 2022-11-29 RX ORDER — CYCLOSPORINE 0.5 MG/ML
1 EMULSION OPHTHALMIC 2 TIMES DAILY
Qty: 60 EACH | Refills: 12 | Status: SHIPPED | OUTPATIENT
Start: 2022-11-29 | End: 2023-05-30 | Stop reason: SDUPTHER

## 2022-11-29 NOTE — PROGRESS NOTES
SUBJECTIVE  So ADRIANA Jaramillo is 89 y.o. female  Corrected distance visual acuity was 20/30 +1 in the right eye and 20/25 -1 in the left eye.   Chief Complaint   Patient presents with    Glaucoma     Pt reports for 6m HVF and dil. Denies any pain or irritation. Va stable. 100% compliant with gtts.           HPI     Glaucoma     Additional comments: Pt reports for 6m HVF and dil. Denies any pain or   irritation. Va stable. 100% compliant with gtts.            Comments    1. Mild COAG (Dx'd by Dr. Shaw) goal = 20   (init 21/21 on Latanoprost) (High IOP off of Latanoprost 30/30)   2. PCIOL w/iStent OD 1/20/16   PCIOL OS w/iStent 12/30/15   3. Blepharitis/Rosacea   4. Dry Eyes   (Restasis ineffective)   Xiidra - bad taste   5. Ocular Migraine     OU: Latanoprost QHS     OU: Systane PRN   OU: Restasis qd   Fish oil po prn            Last edited by Carson Freeman on 11/29/2022  3:36 PM.         Assessment /Plan :  1. Primary open angle glaucoma of both eyes, mild stage Doing well, intraocular pressure (IOP) within acceptable range relative to target IOP and no evidence of progression. Continue current treatment. Reviewed importance of continued compliance with treatment and follow up.      Patient instructed to continue using the following glaucoma medication as follows:  Latanoprost one drop in each eye nightly    Return to clinic in 6 months  or as needed.  With IOP Check and GOCT     2. Pseudophakia of both eyes  -- Condition stable, no therapeutic change required. Monitoring routinely.     3. Left posterior capsular opacification - monitor for now   4. Dry eyes, bilateral  -- Condition stable, no therapeutic change required. Monitoring routinely.

## 2023-05-30 DIAGNOSIS — H04.123 DRY EYES, BILATERAL: ICD-10-CM

## 2023-05-30 RX ORDER — CYCLOSPORINE 0.5 MG/ML
1 EMULSION OPHTHALMIC 2 TIMES DAILY
Qty: 60 EACH | Refills: 12 | Status: SHIPPED | OUTPATIENT
Start: 2023-05-30

## 2023-10-17 DIAGNOSIS — H40.1131 PRIMARY OPEN ANGLE GLAUCOMA OF BOTH EYES, MILD STAGE: ICD-10-CM

## 2023-10-17 RX ORDER — LATANOPROST 50 UG/ML
SOLUTION/ DROPS OPHTHALMIC
Qty: 2.5 ML | Refills: 1 | Status: SHIPPED | OUTPATIENT
Start: 2023-10-17 | End: 2023-10-31

## 2023-10-31 DIAGNOSIS — H40.1131 PRIMARY OPEN ANGLE GLAUCOMA OF BOTH EYES, MILD STAGE: ICD-10-CM

## 2023-10-31 RX ORDER — LATANOPROST 50 UG/ML
SOLUTION/ DROPS OPHTHALMIC
Qty: 7.5 ML | Refills: 1 | Status: SHIPPED | OUTPATIENT
Start: 2023-10-31

## 2024-05-01 DIAGNOSIS — H40.1131 PRIMARY OPEN ANGLE GLAUCOMA OF BOTH EYES, MILD STAGE: ICD-10-CM

## 2024-05-01 RX ORDER — LATANOPROST 50 UG/ML
1 SOLUTION/ DROPS OPHTHALMIC NIGHTLY
Qty: 7.5 ML | Refills: 1 | Status: SHIPPED | OUTPATIENT
Start: 2024-05-01 | End: 2024-05-15 | Stop reason: SDUPTHER

## 2024-05-01 NOTE — TELEPHONE ENCOUNTER
Returned patient call to let her know she has not been seen in over 2 years and that she would need to be seen and I would have one refill sent to her pharmacy, however she has her spam blocker on and it would not allow me to leave a message.        ----- Message from Jia Muro sent at 5/1/2024 10:03 AM CDT -----  Contact: So Rizzo is needing a call back regarding needing latanoprost 0.005 % ophthalmic solution. Please call back at   [phone]  [facility]/pharmacy #[id number] - [place] - [address] AT [facility]  [address] [place] [address]  Phone: [phone] Fax: [phone]

## 2024-05-15 DIAGNOSIS — H40.1131 PRIMARY OPEN ANGLE GLAUCOMA OF BOTH EYES, MILD STAGE: ICD-10-CM

## 2024-05-15 RX ORDER — LATANOPROST 50 UG/ML
1 SOLUTION/ DROPS OPHTHALMIC NIGHTLY
Qty: 7.5 ML | Refills: 1 | OUTPATIENT
Start: 2024-05-15

## 2024-05-16 RX ORDER — LATANOPROST 50 UG/ML
1 SOLUTION/ DROPS OPHTHALMIC NIGHTLY
Qty: 7.5 ML | Refills: 1 | Status: SHIPPED | OUTPATIENT
Start: 2024-05-16

## 2025-03-05 ENCOUNTER — HOSPITAL ENCOUNTER (EMERGENCY)
Facility: HOSPITAL | Age: OVER 89
Discharge: HOME | End: 2025-03-05
Attending: EMERGENCY MEDICINE
Payer: MEDICARE

## 2025-03-05 VITALS
OXYGEN SATURATION: 98 % | WEIGHT: 135 LBS | BODY MASS INDEX: 26.5 KG/M2 | HEART RATE: 59 BPM | TEMPERATURE: 98 F | HEIGHT: 60 IN | SYSTOLIC BLOOD PRESSURE: 129 MMHG | RESPIRATION RATE: 20 BRPM | DIASTOLIC BLOOD PRESSURE: 57 MMHG

## 2025-03-05 DIAGNOSIS — R55 SYNCOPE: ICD-10-CM

## 2025-03-05 DIAGNOSIS — I10 ESSENTIAL HYPERTENSION: Primary | ICD-10-CM

## 2025-03-05 LAB
ALBUMIN SERPL BCP-MCNC: 3.6 G/DL (ref 3.5–5.2)
ALP SERPL-CCNC: 111 U/L (ref 40–150)
ALT SERPL W/O P-5'-P-CCNC: 14 U/L (ref 10–44)
ANION GAP SERPL CALC-SCNC: 11 MMOL/L (ref 8–16)
AST SERPL-CCNC: 18 U/L (ref 10–40)
BASOPHILS # BLD AUTO: 0.01 K/UL (ref 0–0.2)
BASOPHILS NFR BLD: 0.1 % (ref 0–1.9)
BILIRUB SERPL-MCNC: 0.3 MG/DL (ref 0.1–1)
BUN SERPL-MCNC: 20 MG/DL (ref 10–30)
CALCIUM SERPL-MCNC: 9.3 MG/DL (ref 8.7–10.5)
CHLORIDE SERPL-SCNC: 105 MMOL/L (ref 95–110)
CO2 SERPL-SCNC: 21 MMOL/L (ref 23–29)
CREAT SERPL-MCNC: 0.8 MG/DL (ref 0.5–1.4)
DIFFERENTIAL METHOD BLD: ABNORMAL
EOSINOPHIL # BLD AUTO: 0 K/UL (ref 0–0.5)
EOSINOPHIL NFR BLD: 0 % (ref 0–8)
ERYTHROCYTE [DISTWIDTH] IN BLOOD BY AUTOMATED COUNT: 12 % (ref 11.5–14.5)
EST. GFR  (NO RACE VARIABLE): >60 ML/MIN/1.73 M^2
GLUCOSE SERPL-MCNC: 158 MG/DL (ref 70–110)
HCT VFR BLD AUTO: 43.4 % (ref 37–48.5)
HGB BLD-MCNC: 14.4 G/DL (ref 12–16)
IMM GRANULOCYTES # BLD AUTO: 0.09 K/UL (ref 0–0.04)
IMM GRANULOCYTES NFR BLD AUTO: 0.7 % (ref 0–0.5)
LYMPHOCYTES # BLD AUTO: 1.2 K/UL (ref 1–4.8)
LYMPHOCYTES NFR BLD: 9.5 % (ref 18–48)
MAGNESIUM SERPL-MCNC: 2.2 MG/DL (ref 1.6–2.6)
MCH RBC QN AUTO: 30.6 PG (ref 27–31)
MCHC RBC AUTO-ENTMCNC: 33.2 G/DL (ref 32–36)
MCV RBC AUTO: 92 FL (ref 82–98)
MONOCYTES # BLD AUTO: 0.4 K/UL (ref 0.3–1)
MONOCYTES NFR BLD: 3.2 % (ref 4–15)
NEUTROPHILS # BLD AUTO: 10.7 K/UL (ref 1.8–7.7)
NEUTROPHILS NFR BLD: 86.5 % (ref 38–73)
NRBC BLD-RTO: 0 /100 WBC
PLATELET # BLD AUTO: 218 K/UL (ref 150–450)
PMV BLD AUTO: 10.3 FL (ref 9.2–12.9)
POTASSIUM SERPL-SCNC: 4 MMOL/L (ref 3.5–5.1)
PROT SERPL-MCNC: 7.4 G/DL (ref 6–8.4)
RBC # BLD AUTO: 4.7 M/UL (ref 4–5.4)
SODIUM SERPL-SCNC: 137 MMOL/L (ref 136–145)
TROPONIN I SERPL DL<=0.01 NG/ML-MCNC: 0.01 NG/ML (ref 0–0.03)
WBC # BLD AUTO: 12.33 K/UL (ref 3.9–12.7)

## 2025-03-05 PROCEDURE — 25000003 PHARM REV CODE 250: Performed by: EMERGENCY MEDICINE

## 2025-03-05 PROCEDURE — 93005 ELECTROCARDIOGRAM TRACING: CPT

## 2025-03-05 PROCEDURE — 99284 EMERGENCY DEPT VISIT MOD MDM: CPT | Mod: 25

## 2025-03-05 PROCEDURE — 85025 COMPLETE CBC W/AUTO DIFF WBC: CPT | Performed by: EMERGENCY MEDICINE

## 2025-03-05 PROCEDURE — 80053 COMPREHEN METABOLIC PANEL: CPT | Performed by: EMERGENCY MEDICINE

## 2025-03-05 PROCEDURE — 93010 ELECTROCARDIOGRAM REPORT: CPT | Mod: ,,, | Performed by: INTERNAL MEDICINE

## 2025-03-05 PROCEDURE — 83735 ASSAY OF MAGNESIUM: CPT | Performed by: EMERGENCY MEDICINE

## 2025-03-05 PROCEDURE — 84484 ASSAY OF TROPONIN QUANT: CPT | Performed by: EMERGENCY MEDICINE

## 2025-03-05 RX ORDER — CLONIDINE HYDROCHLORIDE 0.1 MG/1
0.1 TABLET ORAL
Status: COMPLETED | OUTPATIENT
Start: 2025-03-05 | End: 2025-03-05

## 2025-03-05 RX ORDER — AMLODIPINE BESYLATE 5 MG/1
5 TABLET ORAL
Status: COMPLETED | OUTPATIENT
Start: 2025-03-05 | End: 2025-03-05

## 2025-03-05 RX ORDER — ACETAMINOPHEN 500 MG
1000 TABLET ORAL
Status: COMPLETED | OUTPATIENT
Start: 2025-03-05 | End: 2025-03-05

## 2025-03-05 RX ADMIN — CLONIDINE HYDROCHLORIDE 0.1 MG: 0.1 TABLET ORAL at 05:03

## 2025-03-05 RX ADMIN — AMLODIPINE BESYLATE 5 MG: 5 TABLET ORAL at 05:03

## 2025-03-05 RX ADMIN — ACETAMINOPHEN 1000 MG: 500 TABLET ORAL at 05:03

## 2025-03-05 NOTE — ED NOTES
Bed: 22  Expected date:   Expected time:   Means of arrival: Ambulance Service  Comments:  When clean

## 2025-03-05 NOTE — ED PROVIDER NOTES
SCRIBE #1 NOTE: I, Shari Calles, am scribing for, and in the presence of, Jerrod Pedro MD. I have scribed the entire note.       History     Chief Complaint   Patient presents with    Loss of Consciousness     Per EMS , patient's family reported syncopal episode while having a bowel movement at home; family denies patient hitting head, and reports witnessing episode     Review of patient's allergies indicates:   Allergen Reactions    Sulfa (sulfonamide antibiotics) Other (See Comments)     Stomach pain    Codeine     Tramadol Other (See Comments)         History of Present Illness     HPI    3/5/2025, 4:27 PM  History obtained from the patient, medical records, and daughter      History of Present Illness: So Jaramillo is a 89 y.o. female patient with a PMHx of HTN, glaucoma, arthritis, and hyperlipidemia who presents to the Emergency Department for evaluation due to the pt losing consciousness. Pt states she was trying to use the bathroom, and was straining then she passed out. The pt's daughter was with her while the pt was using the bathroom and saw everything. Pt's daughter reports the pt did not fall or hit her head when she loss consciousness.  Pt takes Lortab every night to help her go to sleep. Symptoms are constant and moderate in severity. No mitigating or exacerbating factors reported. Associated sxs include weakness, diarrhea (less than a week ago), and constipation. Patient denies any abdominal pain, dysuria, blood in stool, CP, N/V, SOB, or fever. No prior Tx specified. No further complaints or concerns at this time.       Arrival mode: Ambulance Service    PCP: Kan Alston MD        Past Medical History:  Past Medical History:   Diagnosis Date    Arthritis     Cataract     Glaucoma     Hyperlipidemia     Hypertension        Past Surgical History:  Past Surgical History:   Procedure Laterality Date    carotid surgery      CATARACT EXTRACTION W/  INTRAOCULAR LENS IMPLANT Right 1-20-16    I  stent    CATARACT EXTRACTION W/  INTRAOCULAR LENS IMPLANT Left 12-30-15    I stent    HEMORRHOID SURGERY      HYSTERECTOMY      TONSILLECTOMY           Family History:  Family History   Problem Relation Name Age of Onset    Glaucoma Cousin      Cancer Father      Diabetes Maternal Aunt      Stroke Maternal Aunt      Diabetes Maternal Grandmother      Diabetes Other      Strabismus Neg Hx      Retinal detachment Neg Hx      Macular degeneration Neg Hx      Blindness Neg Hx      Amblyopia Neg Hx      Cataracts Neg Hx      Hypertension Neg Hx      Thyroid disease Neg Hx         Social History:  Social History     Tobacco Use    Smoking status: Never    Smokeless tobacco: Never   Substance and Sexual Activity    Alcohol use: No    Drug use: No    Sexual activity: Not on file        Review of Systems     Review of Systems   Constitutional:  Negative for fever.   HENT:  Negative for sore throat.    Respiratory:  Negative for shortness of breath.    Cardiovascular:  Negative for chest pain.   Gastrointestinal:  Positive for constipation and diarrhea. Negative for abdominal pain, blood in stool, nausea and vomiting.   Genitourinary:  Negative for dysuria.   Musculoskeletal:  Negative for back pain.   Skin:  Negative for rash.   Neurological:  Positive for syncope and weakness.   Hematological:  Does not bruise/bleed easily.   All other systems reviewed and are negative.       Physical Exam     Initial Vitals [03/05/25 1505]   BP Pulse Resp Temp SpO2   (!) 166/76 70 18 98.4 °F (36.9 °C) 96 %      MAP       --          Physical Exam  Nursing Notes and Vital Signs Reviewed.  Constitutional: Patient is in no acute distress. Well-developed and well-nourished.  Head: Atraumatic. Normocephalic.  Eyes: PERRL. EOM intact. Conjunctivae are not pale. No scleral icterus.  ENT: Mucous membranes are moist.   Neck: Supple. Full ROM.   Cardiovascular: Regular rate. Regular rhythm. No murmurs, rubs, or gallops. Distal pulses are 2+ and  symmetric.  Pulmonary/Chest: No respiratory distress. Clear to auscultation bilaterally. No wheezing or rales.  Abdominal: Soft and non-distended.  There is no tenderness.  No rebound, guarding, or rigidity.   Genitourinary: No CVA tenderness.  Musculoskeletal: Moves all extremities. No obvious deformities. No edema.   Skin: Warm and dry.  Neurological:  Alert, awake, and appropriate.  Normal speech.  No acute focal neurological deficits are appreciated.  Psychiatric: Normal affect. Good eye contact. Appropriate in content.     ED Course   Procedures  ED Vital Signs:  Vitals:    03/05/25 1505 03/05/25 1700 03/05/25 1718 03/05/25 1745   BP: (!) 166/76 (!) 217/97  (!) 165/73   Pulse: 70 81 71 64   Resp: 18 20 17 19   Temp: 98.4 °F (36.9 °C)      TempSrc: Oral      SpO2: 96% 99% 97% 96%   Weight:   61.2 kg (135 lb)    Height: 5' (1.524 m)       03/05/25 1800   BP: (!) 125/59   Pulse: 60   Resp: 18   Temp:    TempSrc:    SpO2: 96%   Weight:    Height:        Abnormal Lab Results:  Labs Reviewed   CBC W/ AUTO DIFFERENTIAL - Abnormal       Result Value    WBC 12.33      RBC 4.70      Hemoglobin 14.4      Hematocrit 43.4      MCV 92      MCH 30.6      MCHC 33.2      RDW 12.0      Platelets 218      MPV 10.3      Immature Granulocytes 0.7 (*)     Gran # (ANC) 10.7 (*)     Immature Grans (Abs) 0.09 (*)     Lymph # 1.2      Mono # 0.4      Eos # 0.0      Baso # 0.01      nRBC 0      Gran % 86.5 (*)     Lymph % 9.5 (*)     Mono % 3.2 (*)     Eosinophil % 0.0      Basophil % 0.1      Differential Method Automated     COMPREHENSIVE METABOLIC PANEL - Abnormal    Sodium 137      Potassium 4.0      Chloride 105      CO2 21 (*)     Glucose 158 (*)     BUN 20      Creatinine 0.8      Calcium 9.3      Total Protein 7.4      Albumin 3.6      Total Bilirubin 0.3      Alkaline Phosphatase 111      AST 18      ALT 14      eGFR >60      Anion Gap 11     MAGNESIUM    Magnesium 2.2     TROPONIN I    Troponin I 0.007          All Lab  Results:  Results for orders placed or performed during the hospital encounter of 03/05/25   CBC auto differential    Collection Time: 03/05/25  5:14 PM   Result Value Ref Range    WBC 12.33 3.90 - 12.70 K/uL    RBC 4.70 4.00 - 5.40 M/uL    Hemoglobin 14.4 12.0 - 16.0 g/dL    Hematocrit 43.4 37.0 - 48.5 %    MCV 92 82 - 98 fL    MCH 30.6 27.0 - 31.0 pg    MCHC 33.2 32.0 - 36.0 g/dL    RDW 12.0 11.5 - 14.5 %    Platelets 218 150 - 450 K/uL    MPV 10.3 9.2 - 12.9 fL    Immature Granulocytes 0.7 (H) 0.0 - 0.5 %    Gran # (ANC) 10.7 (H) 1.8 - 7.7 K/uL    Immature Grans (Abs) 0.09 (H) 0.00 - 0.04 K/uL    Lymph # 1.2 1.0 - 4.8 K/uL    Mono # 0.4 0.3 - 1.0 K/uL    Eos # 0.0 0.0 - 0.5 K/uL    Baso # 0.01 0.00 - 0.20 K/uL    nRBC 0 0 /100 WBC    Gran % 86.5 (H) 38.0 - 73.0 %    Lymph % 9.5 (L) 18.0 - 48.0 %    Mono % 3.2 (L) 4.0 - 15.0 %    Eosinophil % 0.0 0.0 - 8.0 %    Basophil % 0.1 0.0 - 1.9 %    Differential Method Automated    Comprehensive metabolic panel    Collection Time: 03/05/25  5:14 PM   Result Value Ref Range    Sodium 137 136 - 145 mmol/L    Potassium 4.0 3.5 - 5.1 mmol/L    Chloride 105 95 - 110 mmol/L    CO2 21 (L) 23 - 29 mmol/L    Glucose 158 (H) 70 - 110 mg/dL    BUN 20 10 - 30 mg/dL    Creatinine 0.8 0.5 - 1.4 mg/dL    Calcium 9.3 8.7 - 10.5 mg/dL    Total Protein 7.4 6.0 - 8.4 g/dL    Albumin 3.6 3.5 - 5.2 g/dL    Total Bilirubin 0.3 0.1 - 1.0 mg/dL    Alkaline Phosphatase 111 40 - 150 U/L    AST 18 10 - 40 U/L    ALT 14 10 - 44 U/L    eGFR >60 >60 mL/min/1.73 m^2    Anion Gap 11 8 - 16 mmol/L   Magnesium    Collection Time: 03/05/25  5:14 PM   Result Value Ref Range    Magnesium 2.2 1.6 - 2.6 mg/dL   Troponin I    Collection Time: 03/05/25  5:14 PM   Result Value Ref Range    Troponin I 0.007 0.000 - 0.026 ng/mL       Imaging Results:  Imaging Results    None          The EKG was ordered, reviewed, and independently interpreted by the ED provider.  Interpretation time: 4:34  Rate: 77 BPM  Rhythm:  Sinus rhythm with sinus arrhythmia with 1st degree A-V block  Interpretation: Otherwise normal ECG. No STEMI.           The Emergency Provider reviewed the vital signs and test results, which are outlined above.     ED Discussion     6:08 PM: Reassessed pt at this time. Discussed with patient and/or family/caretaker all pertinent ED information and results. Discussed pt dx and plan of tx. Gave the patient and family all f/u and return to the ED instructions. All questions and concerns were addressed at this time. Patient and/or family/caretaker expresses understanding of information and instructions, and is comfortable with plan to discharge. Pt is stable for discharge.     I discussed with patient and/or family/caretaker that evaluation in the ED does not suggest any emergent or life threatening medical conditions requiring immediate intervention beyond what was provided in the ED, and I believe patient is safe for discharge.  Regardless, an unremarkable evaluation in the ED does not preclude the development or presence of a serious of life threatening condition. As such, I instructed that the patient is to return immediately for any worsening or change in current symptoms.    ED Course as of 03/05/25 1829   Wed Mar 05, 2025   1814 /59 [DP]      ED Course User Index  [DP] Jerrod Pedro MD     Medical Decision Making  89-year-old female who states that she deals with alternating diarrhea and constipation.  Today she was constipated and straining on the toilet to have a bowel movement when she had a syncopal episode.  This was witnessed.  She did not fall or have any traumatic injury.  She is back to baseline here in the emergency department.  ED workup unremarkable.  Blood pressure was elevated on arrival.  It was treated and improved to 125/59 after treatment.  She does take hydrocodone nightly.  She states she takes it primarily to sleep.  Discussed the constipation side-effect of hydrocodone.  Discussed  potential alternatives that could help her sleep without the side-effect of constipation.  Also discussed over-the-counter fiber supplements.  Discharged home in stable condition to follow up with outpatient primary care physician.  ER return precautions provided    Amount and/or Complexity of Data Reviewed  Independent Historian:      Details: Family at bedside provides the witnessed account of her syncopal episode.  External Data Reviewed: notes.     Details: Past medical history, medications, allergies reviewed  Labs: ordered. Decision-making details documented in ED Course.  ECG/medicine tests: ordered and independent interpretation performed. Decision-making details documented in ED Course.    Risk  OTC drugs.  Prescription drug management.                ED Medication(s):  Medications   amLODIPine tablet 5 mg (5 mg Oral Given 3/5/25 1714)   cloNIDine tablet 0.1 mg (0.1 mg Oral Given 3/5/25 1714)   acetaminophen tablet 1,000 mg (1,000 mg Oral Given 3/5/25 1714)       New Prescriptions    No medications on file        Follow-up Information       Call  Kan Alston MD.    Specialty: Internal Medicine  Contact information:  [address] [facility] [address] [phone]               [facility] hospital - Emergency Dept..    Specialty: Emergency Medicine  Why: As needed, If symptoms worsen  Contact information:  [address] [facility] [address] [phone]                               Scribe Attestation:   Scribe #1: I performed the above scribed service and the documentation accurately describes the services I performed. I attest to the accuracy of the note.     Attending:   Physician Attestation Statement for Scribe #1: I, Jerrod Pedro MD, personally performed the services described in this documentation, as scribed by Shari Calles, in my presence, and it is both accurate and complete.           Clinical Impression       ICD-10-CM ICD-9-CM   1. Essential hypertension  I10 401.9    2. Syncope  R55 780.2       Disposition:   Disposition: Discharged  Condition: Stable        Jerrod Pedro MD  03/05/25 9590

## 2025-03-06 LAB
OHS QRS DURATION: 74 MS
OHS QTC CALCULATION: 425 MS